# Patient Record
Sex: MALE | Race: WHITE | Employment: OTHER | URBAN - METROPOLITAN AREA
[De-identification: names, ages, dates, MRNs, and addresses within clinical notes are randomized per-mention and may not be internally consistent; named-entity substitution may affect disease eponyms.]

---

## 2017-03-02 ENCOUNTER — HOSPITAL ENCOUNTER (EMERGENCY)
Facility: HOSPITAL | Age: 62
Discharge: HOME | End: 2017-03-02
Attending: EMERGENCY MEDICINE
Payer: MEDICARE

## 2017-03-02 ENCOUNTER — APPOINTMENT (OUTPATIENT)
Dept: GENERAL RADIOLOGY | Facility: HOSPITAL | Age: 62
End: 2017-03-02
Attending: EMERGENCY MEDICINE
Payer: MEDICARE

## 2017-03-02 ENCOUNTER — PRIOR ORIGINAL RECORDS (OUTPATIENT)
Dept: OTHER | Age: 62
End: 2017-03-02

## 2017-03-02 VITALS
HEIGHT: 70 IN | OXYGEN SATURATION: 98 % | RESPIRATION RATE: 16 BRPM | WEIGHT: 285 LBS | SYSTOLIC BLOOD PRESSURE: 142 MMHG | BODY MASS INDEX: 40.8 KG/M2 | DIASTOLIC BLOOD PRESSURE: 76 MMHG | TEMPERATURE: 98 F | HEART RATE: 66 BPM

## 2017-03-02 DIAGNOSIS — R07.9 ACUTE CHEST PAIN: Primary | ICD-10-CM

## 2017-03-02 DIAGNOSIS — I10 ESSENTIAL HYPERTENSION: ICD-10-CM

## 2017-03-02 DIAGNOSIS — I25.5 ISCHEMIC CARDIOMYOPATHY: ICD-10-CM

## 2017-03-02 DIAGNOSIS — I25.10 CORONARY ARTERY DISEASE INVOLVING NATIVE HEART WITHOUT ANGINA PECTORIS, UNSPECIFIED VESSEL OR LESION TYPE: ICD-10-CM

## 2017-03-02 LAB
ALBUMIN SERPL-MCNC: 3.6 G/DL (ref 3.5–4.8)
ALP LIVER SERPL-CCNC: 90 U/L (ref 45–117)
ALT SERPL-CCNC: 24 U/L (ref 17–63)
APTT PPP: 34.9 SECONDS (ref 25–34)
AST SERPL-CCNC: 15 U/L (ref 15–41)
ATRIAL RATE: 73 BPM
BASOPHILS # BLD AUTO: 0.04 X10(3) UL (ref 0–0.1)
BASOPHILS NFR BLD AUTO: 0.7 %
BILIRUB SERPL-MCNC: 0.4 MG/DL (ref 0.1–2)
BUN BLD-MCNC: 17 MG/DL (ref 8–20)
CALCIUM BLD-MCNC: 9 MG/DL (ref 8.3–10.3)
CHLORIDE: 102 MMOL/L (ref 101–111)
CO2: 26 MMOL/L (ref 22–32)
CREAT BLD-MCNC: 1.03 MG/DL (ref 0.7–1.3)
EOSINOPHIL # BLD AUTO: 0.09 X10(3) UL (ref 0–0.3)
EOSINOPHIL NFR BLD AUTO: 1.5 %
ERYTHROCYTE [DISTWIDTH] IN BLOOD BY AUTOMATED COUNT: 14.9 % (ref 11.5–16)
GLUCOSE BLD-MCNC: 131 MG/DL (ref 70–99)
HCT VFR BLD AUTO: 36.3 % (ref 37–53)
HGB BLD-MCNC: 12 G/DL (ref 13–17)
IMMATURE GRANULOCYTE COUNT: 0.02 X10(3) UL (ref 0–1)
IMMATURE GRANULOCYTE RATIO %: 0.3 %
INR BLD: 2.12 (ref 0.89–1.11)
LYMPHOCYTES # BLD AUTO: 1.44 X10(3) UL (ref 0.9–4)
LYMPHOCYTES NFR BLD AUTO: 24 %
M PROTEIN MFR SERPL ELPH: 7.6 G/DL (ref 6.1–8.3)
MCH RBC QN AUTO: 28.6 PG (ref 27–33.2)
MCHC RBC AUTO-ENTMCNC: 33.1 G/DL (ref 31–37)
MCV RBC AUTO: 86.4 FL (ref 80–99)
MONOCYTES # BLD AUTO: 0.54 X10(3) UL (ref 0.1–0.6)
MONOCYTES NFR BLD AUTO: 9 %
NEUTROPHIL ABS PRELIM: 3.86 X10 (3) UL (ref 1.3–6.7)
NEUTROPHILS # BLD AUTO: 3.86 X10(3) UL (ref 1.3–6.7)
NEUTROPHILS NFR BLD AUTO: 64.5 %
P AXIS: 53 DEGREES
P-R INTERVAL: 180 MS
PLATELET # BLD AUTO: 248 10(3)UL (ref 150–450)
POTASSIUM SERPL-SCNC: 3.3 MMOL/L (ref 3.6–5.1)
PRO-BETA NATRIURETIC PEPTIDE: 89 PG/ML (ref ?–125)
PSA SERPL DL<=0.01 NG/ML-MCNC: 24.1 SECONDS (ref 12–14.3)
Q-T INTERVAL: 410 MS
QRS DURATION: 106 MS
QTC CALCULATION (BEZET): 451 MS
R AXIS: 1 DEGREES
RBC # BLD AUTO: 4.2 X10(6)UL (ref 4.3–5.7)
RED CELL DISTRIBUTION WIDTH-SD: 47.1 FL (ref 35.1–46.3)
SODIUM SERPL-SCNC: 139 MMOL/L (ref 136–144)
T AXIS: 40 DEGREES
TROPONIN: <0.046 NG/ML (ref ?–0.05)
TROPONIN: <0.046 NG/ML (ref ?–0.05)
VENTRICULAR RATE: 73 BPM
WBC # BLD AUTO: 6 X10(3) UL (ref 4–13)

## 2017-03-02 PROCEDURE — 93005 ELECTROCARDIOGRAM TRACING: CPT

## 2017-03-02 PROCEDURE — 99285 EMERGENCY DEPT VISIT HI MDM: CPT

## 2017-03-02 PROCEDURE — 84484 ASSAY OF TROPONIN QUANT: CPT | Performed by: EMERGENCY MEDICINE

## 2017-03-02 PROCEDURE — 85730 THROMBOPLASTIN TIME PARTIAL: CPT | Performed by: EMERGENCY MEDICINE

## 2017-03-02 PROCEDURE — 85025 COMPLETE CBC W/AUTO DIFF WBC: CPT | Performed by: EMERGENCY MEDICINE

## 2017-03-02 PROCEDURE — 93010 ELECTROCARDIOGRAM REPORT: CPT

## 2017-03-02 PROCEDURE — 80053 COMPREHEN METABOLIC PANEL: CPT | Performed by: EMERGENCY MEDICINE

## 2017-03-02 PROCEDURE — 96365 THER/PROPH/DIAG IV INF INIT: CPT

## 2017-03-02 PROCEDURE — 71010 XR CHEST AP PORTABLE  (CPT=71010): CPT

## 2017-03-02 PROCEDURE — 83880 ASSAY OF NATRIURETIC PEPTIDE: CPT | Performed by: EMERGENCY MEDICINE

## 2017-03-02 PROCEDURE — 85610 PROTHROMBIN TIME: CPT | Performed by: EMERGENCY MEDICINE

## 2017-03-02 RX ORDER — ISOSORBIDE MONONITRATE 30 MG/1
30 TABLET, EXTENDED RELEASE ORAL DAILY
Qty: 20 TABLET | Refills: 0 | Status: ON HOLD | OUTPATIENT
Start: 2017-03-02 | End: 2017-03-16

## 2017-03-02 RX ORDER — ISOSORBIDE MONONITRATE 30 MG/1
30 TABLET, EXTENDED RELEASE ORAL DAILY
Status: DISCONTINUED | OUTPATIENT
Start: 2017-03-02 | End: 2017-03-02

## 2017-03-02 RX ORDER — NITROGLYCERIN 20 MG/100ML
INJECTION INTRAVENOUS CONTINUOUS
Status: DISCONTINUED | OUTPATIENT
Start: 2017-03-02 | End: 2017-03-02

## 2017-03-02 RX ORDER — ASPIRIN 81 MG/1
324 TABLET, CHEWABLE ORAL ONCE
Status: COMPLETED | OUTPATIENT
Start: 2017-03-02 | End: 2017-03-02

## 2017-03-02 NOTE — ED PROVIDER NOTES
Patient Seen in: [facility] Emergency Department    History   Patient presents with:  Chest Pain Angina (cardiovascular)    Stated Complaint: chest pain    HPI    52-year-old male with a history of hypertension, hyperlipidemia, morbid obesity, statu total) by mouth nightly. Potassium Chloride ER 20 MEQ Oral Tab CR,  Take 1 tablet (20 mEq total) by mouth daily. HydrALAZINE HCl 100 MG Oral Tab,  Take 100 mg by mouth 3 (three) times daily.      Montelukast Sodium 10 MG Oral Tab,  Take 10 mg by mouth n evidence of injection or perforation. Neck exam: Supple. There is no lymphadenopathy or carotid bruit. Cardiovascular exam: Regular rate and rhythm. There are no murmurs, rubs, gallops. Lungs: Clear to auscultation bilaterally.   There is no audible wh results for these tests on the individual orders. RAINBOW DRAW BLUE   RAINBOW DRAW GOLD   RAINBOW DRAW LAVENDER   RAINBOW DRAW LIGHT GREEN      EKG    Rate, intervals and axes as noted on EKG Report.   Rate: 73  Rhythm: Sinus Rhythm  Reading: Normal sinus pain-free. His second troponin was negative. After his Imdur dose of 30 mg his repeat blood pressure was 563 systolic. Patient will be discharged home with the change in medications as directed.   Follow-up with his cardiologist.  He is instructed to ret

## 2017-03-02 NOTE — ED NOTES
Per Dr. Paco Morrow to hold nitroglycerin gtt at this time and new orders received. Updated with pt and pt family with plan of care.

## 2017-03-02 NOTE — ED INITIAL ASSESSMENT (HPI)
Pt presents to the ed with c/o mid-sternal chest pain that started this am at 730 and woke him up from his sleeping, has a headache but denies any n/v/d or fevers/chills at this time. Patient states that the pain radiates to his back at times.   Pt denies

## 2017-03-02 NOTE — ED NOTES
Clarified Imdur order pt to take 60mg PO in the am and than add 30mg PO at night. Dr. Michael Montelongo notified. Detailed discharge instructions were given to patient and patient family at this time.

## 2017-03-08 ENCOUNTER — PRIOR ORIGINAL RECORDS (OUTPATIENT)
Dept: OTHER | Age: 62
End: 2017-03-08

## 2017-03-16 ENCOUNTER — PRIOR ORIGINAL RECORDS (OUTPATIENT)
Dept: OTHER | Age: 62
End: 2017-03-16

## 2017-03-16 ENCOUNTER — APPOINTMENT (OUTPATIENT)
Dept: GENERAL RADIOLOGY | Facility: HOSPITAL | Age: 62
DRG: 310 | End: 2017-03-16
Attending: EMERGENCY MEDICINE
Payer: MEDICARE

## 2017-03-16 ENCOUNTER — HOSPITAL ENCOUNTER (INPATIENT)
Facility: HOSPITAL | Age: 62
LOS: 1 days | Discharge: HOME | DRG: 310 | End: 2017-03-16
Attending: EMERGENCY MEDICINE | Admitting: INTERNAL MEDICINE
Payer: MEDICARE

## 2017-03-16 VITALS
HEART RATE: 71 BPM | OXYGEN SATURATION: 92 % | BODY MASS INDEX: 41 KG/M2 | DIASTOLIC BLOOD PRESSURE: 57 MMHG | WEIGHT: 283.75 LBS | TEMPERATURE: 98 F | RESPIRATION RATE: 18 BRPM | SYSTOLIC BLOOD PRESSURE: 102 MMHG

## 2017-03-16 DIAGNOSIS — I47.2 VENTRICULAR TACHYARRHYTHMIA (HCC): Primary | ICD-10-CM

## 2017-03-16 PROBLEM — R73.9 HYPERGLYCEMIA: Status: ACTIVE | Noted: 2017-03-16

## 2017-03-16 PROBLEM — G47.33 OSA ON CPAP: Status: ACTIVE | Noted: 2017-03-16

## 2017-03-16 PROBLEM — I47.20 VENTRICULAR TACHYARRHYTHMIA (HCC): Status: ACTIVE | Noted: 2017-03-16

## 2017-03-16 PROBLEM — E87.6 HYPOKALEMIA: Status: ACTIVE | Noted: 2017-03-16

## 2017-03-16 PROBLEM — N17.9 ACUTE KIDNEY INJURY (HCC): Status: ACTIVE | Noted: 2017-03-16

## 2017-03-16 PROBLEM — Z99.89 OSA ON CPAP: Status: ACTIVE | Noted: 2017-03-16

## 2017-03-16 LAB
ALBUMIN SERPL-MCNC: 3.9 G/DL (ref 3.5–4.8)
ALP LIVER SERPL-CCNC: 93 U/L (ref 45–117)
ALT SERPL-CCNC: 25 U/L (ref 17–63)
AST SERPL-CCNC: 16 U/L (ref 15–41)
BASOPHILS # BLD AUTO: 0.03 X10(3) UL (ref 0–0.1)
BASOPHILS NFR BLD AUTO: 0.3 %
BILIRUB SERPL-MCNC: 0.4 MG/DL (ref 0.1–2)
BUN BLD-MCNC: 17 MG/DL (ref 8–20)
CALCIUM BLD-MCNC: 8.6 MG/DL (ref 8.3–10.3)
CHLORIDE: 99 MMOL/L (ref 101–111)
CO2: 23 MMOL/L (ref 22–32)
CREAT BLD-MCNC: 1.41 MG/DL (ref 0.7–1.3)
EOSINOPHIL # BLD AUTO: 0.05 X10(3) UL (ref 0–0.3)
EOSINOPHIL NFR BLD AUTO: 0.6 %
ERYTHROCYTE [DISTWIDTH] IN BLOOD BY AUTOMATED COUNT: 14.7 % (ref 11.5–16)
GLUCOSE BLD-MCNC: 119 MG/DL (ref 65–99)
GLUCOSE BLD-MCNC: 126 MG/DL (ref 65–99)
GLUCOSE BLD-MCNC: 143 MG/DL (ref 70–99)
GLUCOSE BLD-MCNC: 84 MG/DL (ref 65–99)
HAV IGM SER QL: 1.8 MG/DL (ref 1.7–3)
HCT VFR BLD AUTO: 37.7 % (ref 37–53)
HGB BLD-MCNC: 12.7 G/DL (ref 13–17)
IMMATURE GRANULOCYTE COUNT: 0.02 X10(3) UL (ref 0–1)
IMMATURE GRANULOCYTE RATIO %: 0.2 %
INR BLD: 2.38 (ref 0.89–1.11)
LYMPHOCYTES # BLD AUTO: 1.69 X10(3) UL (ref 0.9–4)
LYMPHOCYTES NFR BLD AUTO: 19.1 %
M PROTEIN MFR SERPL ELPH: 8.4 G/DL (ref 6.1–8.3)
MCH RBC QN AUTO: 28.6 PG (ref 27–33.2)
MCHC RBC AUTO-ENTMCNC: 33.7 G/DL (ref 31–37)
MCV RBC AUTO: 84.9 FL (ref 80–99)
MONOCYTES # BLD AUTO: 0.68 X10(3) UL (ref 0.1–0.6)
MONOCYTES NFR BLD AUTO: 7.7 %
NEUTROPHIL ABS PRELIM: 6.38 X10 (3) UL (ref 1.3–6.7)
NEUTROPHILS # BLD AUTO: 6.38 X10(3) UL (ref 1.3–6.7)
NEUTROPHILS NFR BLD AUTO: 72.1 %
PLATELET # BLD AUTO: 327 10(3)UL (ref 150–450)
POTASSIUM SERPL-SCNC: 3.2 MMOL/L (ref 3.6–5.1)
POTASSIUM SERPL-SCNC: 3.7 MMOL/L (ref 3.6–5.1)
PSA SERPL DL<=0.01 NG/ML-MCNC: 26.4 SECONDS (ref 12–14.3)
RBC # BLD AUTO: 4.44 X10(6)UL (ref 4.3–5.7)
RED CELL DISTRIBUTION WIDTH-SD: 45.1 FL (ref 35.1–46.3)
SODIUM SERPL-SCNC: 137 MMOL/L (ref 136–144)
TROPONIN: <0.046 NG/ML (ref ?–0.05)
WBC # BLD AUTO: 8.9 X10(3) UL (ref 4–13)

## 2017-03-16 PROCEDURE — 83735 ASSAY OF MAGNESIUM: CPT | Performed by: EMERGENCY MEDICINE

## 2017-03-16 PROCEDURE — 85610 PROTHROMBIN TIME: CPT | Performed by: EMERGENCY MEDICINE

## 2017-03-16 PROCEDURE — 85025 COMPLETE CBC W/AUTO DIFF WBC: CPT | Performed by: EMERGENCY MEDICINE

## 2017-03-16 PROCEDURE — 93005 ELECTROCARDIOGRAM TRACING: CPT

## 2017-03-16 PROCEDURE — 93283 PRGRMG EVAL IMPLANTABLE DFB: CPT

## 2017-03-16 PROCEDURE — 4B02XTZ MEASUREMENT OF CARDIAC DEFIBRILLATOR, EXTERNAL APPROACH: ICD-10-PCS | Performed by: INTERNAL MEDICINE

## 2017-03-16 PROCEDURE — 4A02XFZ MEASUREMENT OF CARDIAC RHYTHM, EXTERNAL APPROACH: ICD-10-PCS | Performed by: INTERNAL MEDICINE

## 2017-03-16 PROCEDURE — 4A02X4Z MEASUREMENT OF CARDIAC ELECTRICAL ACTIVITY, EXTERNAL APPROACH: ICD-10-PCS | Performed by: INTERNAL MEDICINE

## 2017-03-16 PROCEDURE — 93010 ELECTROCARDIOGRAM REPORT: CPT

## 2017-03-16 PROCEDURE — 94660 CPAP INITIATION&MGMT: CPT

## 2017-03-16 PROCEDURE — 82962 GLUCOSE BLOOD TEST: CPT

## 2017-03-16 PROCEDURE — 84132 ASSAY OF SERUM POTASSIUM: CPT | Performed by: INTERNAL MEDICINE

## 2017-03-16 PROCEDURE — 80053 COMPREHEN METABOLIC PANEL: CPT | Performed by: EMERGENCY MEDICINE

## 2017-03-16 PROCEDURE — 99285 EMERGENCY DEPT VISIT HI MDM: CPT

## 2017-03-16 PROCEDURE — 71010 XR CHEST AP PORTABLE  (CPT=71010): CPT

## 2017-03-16 PROCEDURE — 84484 ASSAY OF TROPONIN QUANT: CPT | Performed by: EMERGENCY MEDICINE

## 2017-03-16 PROCEDURE — 36415 COLL VENOUS BLD VENIPUNCTURE: CPT

## 2017-03-16 RX ORDER — WARFARIN SODIUM 2 MG/1
4 TABLET ORAL NIGHTLY
Status: DISCONTINUED | OUTPATIENT
Start: 2017-03-16 | End: 2017-03-16

## 2017-03-16 RX ORDER — LOSARTAN POTASSIUM 100 MG/1
100 TABLET ORAL DAILY
Status: DISCONTINUED | OUTPATIENT
Start: 2017-03-16 | End: 2017-03-16

## 2017-03-16 RX ORDER — PANTOPRAZOLE SODIUM 20 MG/1
20 TABLET, DELAYED RELEASE ORAL NIGHTLY
Status: DISCONTINUED | OUTPATIENT
Start: 2017-03-16 | End: 2017-03-16

## 2017-03-16 RX ORDER — HYDROCODONE BITARTRATE AND ACETAMINOPHEN 10; 325 MG/1; MG/1
1 TABLET ORAL EVERY 6 HOURS PRN
COMMUNITY
End: 2018-09-25 | Stop reason: ALTCHOICE

## 2017-03-16 RX ORDER — SODIUM CHLORIDE 9 MG/ML
INJECTION, SOLUTION INTRAVENOUS CONTINUOUS
Status: DISCONTINUED | OUTPATIENT
Start: 2017-03-16 | End: 2017-03-16

## 2017-03-16 RX ORDER — POTASSIUM CHLORIDE 20 MEQ/1
40 TABLET, EXTENDED RELEASE ORAL EVERY 4 HOURS
Status: COMPLETED | OUTPATIENT
Start: 2017-03-16 | End: 2017-03-16

## 2017-03-16 RX ORDER — ACETAMINOPHEN 500 MG
500 TABLET ORAL EVERY 6 HOURS PRN
Status: DISCONTINUED | OUTPATIENT
Start: 2017-03-16 | End: 2017-03-16

## 2017-03-16 RX ORDER — MEXILETINE HYDROCHLORIDE 150 MG/1
150 CAPSULE ORAL 2 TIMES DAILY
Qty: 60 CAPSULE | Refills: 5 | Status: ON HOLD | OUTPATIENT
Start: 2017-03-16 | End: 2018-10-20

## 2017-03-16 RX ORDER — ISOSORBIDE MONONITRATE 60 MG/1
60 TABLET, EXTENDED RELEASE ORAL NIGHTLY
Status: DISCONTINUED | OUTPATIENT
Start: 2017-03-16 | End: 2017-03-16

## 2017-03-16 RX ORDER — CLOPIDOGREL BISULFATE 75 MG/1
75 TABLET ORAL DAILY
Status: DISCONTINUED | OUTPATIENT
Start: 2017-03-16 | End: 2017-03-16

## 2017-03-16 RX ORDER — POTASSIUM CHLORIDE 20 MEQ/1
20 TABLET, EXTENDED RELEASE ORAL DAILY
Status: DISCONTINUED | OUTPATIENT
Start: 2017-03-16 | End: 2017-03-16

## 2017-03-16 RX ORDER — CARVEDILOL 12.5 MG/1
25 TABLET ORAL 2 TIMES DAILY WITH MEALS
Status: DISCONTINUED | OUTPATIENT
Start: 2017-03-16 | End: 2017-03-16

## 2017-03-16 RX ORDER — MAGNESIUM OXIDE 400 MG (241.3 MG MAGNESIUM) TABLET
400 TABLET ONCE
Status: COMPLETED | OUTPATIENT
Start: 2017-03-16 | End: 2017-03-16

## 2017-03-16 RX ORDER — CHOLECALCIFEROL (VITAMIN D3) 50 MCG
1 CAPSULE ORAL NIGHTLY
Status: ON HOLD | COMMUNITY
End: 2018-09-27

## 2017-03-16 RX ORDER — ATORVASTATIN CALCIUM 80 MG/1
80 TABLET, FILM COATED ORAL NIGHTLY
Status: DISCONTINUED | OUTPATIENT
Start: 2017-03-16 | End: 2017-03-16

## 2017-03-16 RX ORDER — ACETAMINOPHEN 80 MG/1
500 TABLET, CHEWABLE ORAL EVERY 6 HOURS PRN
Status: DISCONTINUED | OUTPATIENT
Start: 2017-03-16 | End: 2017-03-16 | Stop reason: SDUPTHER

## 2017-03-16 RX ORDER — OMEGA-3/DHA/EPA/FISH OIL 60 MG-90MG
500 CAPSULE ORAL 2 TIMES DAILY
Status: DISCONTINUED | OUTPATIENT
Start: 2017-03-16 | End: 2017-03-16 | Stop reason: RX

## 2017-03-16 RX ORDER — OMEGA-3/DHA/EPA/FISH OIL 60 MG-90MG
1200 CAPSULE ORAL 2 TIMES DAILY
COMMUNITY
End: 2018-09-25

## 2017-03-16 RX ORDER — SERTRALINE HYDROCHLORIDE 100 MG/1
100 TABLET, FILM COATED ORAL DAILY
Status: DISCONTINUED | OUTPATIENT
Start: 2017-03-16 | End: 2017-03-16

## 2017-03-16 RX ORDER — HYDROCODONE BITARTRATE AND ACETAMINOPHEN 10; 325 MG/1; MG/1
1 TABLET ORAL EVERY 6 HOURS PRN
Status: DISCONTINUED | OUTPATIENT
Start: 2017-03-16 | End: 2017-03-16

## 2017-03-16 RX ORDER — TORSEMIDE 20 MG/1
20 TABLET ORAL DAILY
Status: DISCONTINUED | OUTPATIENT
Start: 2017-03-16 | End: 2017-03-16

## 2017-03-16 RX ORDER — ISOSORBIDE MONONITRATE 30 MG/1
60 TABLET, EXTENDED RELEASE ORAL NIGHTLY
Qty: 20 TABLET | Refills: 0 | Status: ON HOLD | OUTPATIENT
Start: 2017-03-16 | End: 2017-08-19

## 2017-03-16 RX ORDER — MONTELUKAST SODIUM 10 MG/1
10 TABLET ORAL DAILY
Status: DISCONTINUED | OUTPATIENT
Start: 2017-03-16 | End: 2017-03-16

## 2017-03-16 RX ORDER — AMIODARONE HYDROCHLORIDE 200 MG/1
200 TABLET ORAL DAILY
Status: DISCONTINUED | OUTPATIENT
Start: 2017-03-16 | End: 2017-03-16

## 2017-03-16 NOTE — CONSULTS
CARDIOLOGY CONSULT - [facility]      NAME: Louise Key - ROOM: A4/A4 - MRN: [id number] - Age: 59year old - :  1955    Date of Admission: 3/16/2017  1:21 AM  Admission Diagnosis: There are no admission diagnoses coronary artery    • Esophageal reflux    • Arthritis         PSH:     Past Surgical History    BYPASS SURGERY  2000    KNEE REPLACEMENT SURGERY      FRACTURE SURGERY          Social History:  Tobacco: negative    Family History of Heart Disease: + CAD in Encounters:  03/16/17 : 286 lb (129.729 kg)  03/02/17 : 285 lb (129.275 kg)  11/09/16 : 278 lb 7.1 oz (126.3 kg)      No intake or output data in the 24 hours ending 03/16/17 0321    Rhythm:  sinus  Neuro:  Alert and oriented, nonfocal  Neck:  No jvd  HEEN

## 2017-03-16 NOTE — PROCEDURES
Procedure  1. ICD induction and DFT test  2. ICD interogation and reprogramming    Indication:  1. 60 y/o with h/o ischemic CMP and ICD implant last year for VF presents for DFT testing. Recently presented with fast VT.   Shock resulted in long Type II norah
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
## 2017-03-16 NOTE — PLAN OF CARE
CARDIOVASCULAR - ADULT    • Maintains optimal cardiac output and hemodynamic stability Progressing    • Absence of cardiac arrhythmias or at baseline Progressing          Assumed care of pt at 0730. Denies any pain or SOB on RA. Sinus rhythm, HR 60-70s.   R

## 2017-03-16 NOTE — ED NOTES
Report given to MONTANA Miller. Pt resting comfortably with no complaints at this time awaiting transport to floor.

## 2017-03-16 NOTE — PLAN OF CARE
Post AICD induction check with Dr. Destiny Markham. AICD delivered shock when v-tach induced. Report given to floor nurse. Patient transferred via bed back to the floor.

## 2017-03-16 NOTE — CONSULTS
Nutritional supplement discontinuation per Policy:    For patient Kishore Christie, Fish oil caps have been discontinued per policy. This supplement may be restarted upon discharge using the medication reconciliation process.     Thanks,  Jacki Jiménez Pharm
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
## 2017-03-16 NOTE — RESPIRATORY THERAPY NOTE
FAISAL : EQUIPMENT USE: DAILY SUMMARY                                            SET MODE:  AUTO CPAP WITH CFLEX                                          USAGE IN HOURS: 2:07                                          9

## 2017-03-16 NOTE — CM/SW NOTE
03/16/17 1600   CM/SW Referral Data   Referral Source Social Work (self-referral)   Reason for Referral Discharge planning   Informant Patient   Patient Info   Patient's Mental Status Alert;Oriented   Patient's [address]   Number of Levels

## 2017-03-16 NOTE — HISTORICAL OFFICE NOTE
Yudith Garcia  : 1955  ACCOUNT:  [id number]  [phone]  PCP: Dr. Karo Lofton     TODAY'S DATE: 2016  DICTATED BY:  Alona Padilla M.D.]    CHIEF COMPLAINT: [Followup of Cardiomyopathy, ischemic.]    HPI:    [On 2016, Valarie Dutton 4V CABG 11/00, angina, CAD, cardiac catheterization, dyslipidemia, hypertension, July 2005: nml LV contractility and hemodynamics, Multivessel native CAD, patent seq,saph. vein graft to the 1st OM,main circ and dist. RCA with patent LIMA to LAD, NQWMI, pat Lipoprotein Deficiencies    PLAN:    [1.  Ischemic cardiomyopathy status post percutaneous revascularization. At this point, I do not think he needs a stress test or a repeat angiogram at this time.   2.  He follows in the ICD clinic for his device which w
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
## 2017-03-17 LAB
ATRIAL RATE: 72 BPM
P AXIS: 30 DEGREES
P-R INTERVAL: 164 MS
Q-T INTERVAL: 402 MS
QRS DURATION: 112 MS
QTC CALCULATION (BEZET): 440 MS
R AXIS: 1 DEGREES
T AXIS: 36 DEGREES
VENTRICULAR RATE: 72 BPM

## 2017-03-17 NOTE — CM/SW NOTE
03/17/17 0800   Discharge disposition   Discharged to: Home or [address] [place] [address] services after discharge None   Discharge transportation Private car   Patient discharged 3/16/17

## 2017-03-17 NOTE — PROGRESS NOTES
NURSING DISCHARGE NOTE    Received orders to discharge pt. Instructed pt about prescribed medications and provided educational handout. Printed prescriptions handed to pt. Instructed pt about follow-up appointments per MD order.  Pt verbalized [place]

## 2017-03-23 ENCOUNTER — PRIOR ORIGINAL RECORDS (OUTPATIENT)
Dept: OTHER | Age: 62
End: 2017-03-23

## 2017-03-27 LAB
HEMATOCRIT: 37.7 %
HEMOGLOBIN: 12.7 G/DL
PLATELETS: 327 K/UL
PROBNP: 89 PG/ML
RED BLOOD COUNT: 4.44 X 10-6/U
WHITE BLOOD COUNT: 8.9 X 10-3/U

## 2017-07-07 ENCOUNTER — MYAURORA ACCOUNT LINK (OUTPATIENT)
Dept: OTHER | Age: 62
End: 2017-07-07

## 2017-08-06 ENCOUNTER — HOSPITAL ENCOUNTER (OUTPATIENT)
Facility: HOSPITAL | Age: 62
Setting detail: OBSERVATION
Discharge: HOME | End: 2017-08-07
Attending: EMERGENCY MEDICINE | Admitting: HOSPITALIST
Payer: MEDICARE

## 2017-08-06 ENCOUNTER — APPOINTMENT (OUTPATIENT)
Dept: GENERAL RADIOLOGY | Facility: HOSPITAL | Age: 62
End: 2017-08-06
Attending: EMERGENCY MEDICINE
Payer: MEDICARE

## 2017-08-06 ENCOUNTER — APPOINTMENT (OUTPATIENT)
Dept: CT IMAGING | Facility: HOSPITAL | Age: 62
End: 2017-08-06
Attending: EMERGENCY MEDICINE
Payer: MEDICARE

## 2017-08-06 DIAGNOSIS — R07.9 ACUTE CHEST PAIN: Primary | ICD-10-CM

## 2017-08-06 LAB
ALBUMIN SERPL-MCNC: 3.6 G/DL (ref 3.5–4.8)
ALP LIVER SERPL-CCNC: 80 U/L (ref 45–117)
ALT SERPL-CCNC: 36 U/L (ref 17–63)
AST SERPL-CCNC: 25 U/L (ref 15–41)
ATRIAL RATE: 62 BPM
BASOPHILS # BLD AUTO: 0.03 X10(3) UL (ref 0–0.1)
BASOPHILS NFR BLD AUTO: 0.5 %
BILIRUB SERPL-MCNC: 0.3 MG/DL (ref 0.1–2)
BUN BLD-MCNC: 13 MG/DL (ref 8–20)
CALCIUM BLD-MCNC: 8.8 MG/DL (ref 8.3–10.3)
CHLORIDE: 104 MMOL/L (ref 101–111)
CO2: 26 MMOL/L (ref 22–32)
CREAT BLD-MCNC: 1.28 MG/DL (ref 0.7–1.3)
EOSINOPHIL # BLD AUTO: 0.07 X10(3) UL (ref 0–0.3)
EOSINOPHIL NFR BLD AUTO: 1.2 %
ERYTHROCYTE [DISTWIDTH] IN BLOOD BY AUTOMATED COUNT: 15.8 % (ref 11.5–16)
EST. AVERAGE GLUCOSE BLD GHB EST-MCNC: 151 MG/DL (ref 68–126)
GLUCOSE BLD-MCNC: 118 MG/DL (ref 65–99)
GLUCOSE BLD-MCNC: 125 MG/DL (ref 65–99)
GLUCOSE BLD-MCNC: 126 MG/DL (ref 70–99)
GLUCOSE BLD-MCNC: 94 MG/DL (ref 65–99)
HBA1C MFR BLD HPLC: 6.9 % (ref ?–5.7)
HCT VFR BLD AUTO: 37.9 % (ref 37–53)
HGB BLD-MCNC: 12.1 G/DL (ref 13–17)
IMMATURE GRANULOCYTE COUNT: 0.01 X10(3) UL (ref 0–1)
IMMATURE GRANULOCYTE RATIO %: 0.2 %
INR BLD: 2.85 (ref 0.89–1.11)
LYMPHOCYTES # BLD AUTO: 1.57 X10(3) UL (ref 0.9–4)
LYMPHOCYTES NFR BLD AUTO: 27.2 %
M PROTEIN MFR SERPL ELPH: 7.5 G/DL (ref 6.1–8.3)
MCH RBC QN AUTO: 27.5 PG (ref 27–33.2)
MCHC RBC AUTO-ENTMCNC: 31.9 G/DL (ref 31–37)
MCV RBC AUTO: 86.1 FL (ref 80–99)
MONOCYTES # BLD AUTO: 0.44 X10(3) UL (ref 0.1–0.6)
MONOCYTES NFR BLD AUTO: 7.6 %
NEUTROPHIL ABS PRELIM: 3.65 X10 (3) UL (ref 1.3–6.7)
NEUTROPHILS # BLD AUTO: 3.65 X10(3) UL (ref 1.3–6.7)
NEUTROPHILS NFR BLD AUTO: 63.3 %
P AXIS: 48 DEGREES
P-R INTERVAL: 176 MS
PLATELET # BLD AUTO: 276 10(3)UL (ref 150–450)
POTASSIUM SERPL-SCNC: 3.8 MMOL/L (ref 3.6–5.1)
PSA SERPL DL<=0.01 NG/ML-MCNC: 30.5 SECONDS (ref 12–14.3)
Q-T INTERVAL: 426 MS
QRS DURATION: 100 MS
QTC CALCULATION (BEZET): 432 MS
R AXIS: 12 DEGREES
RBC # BLD AUTO: 4.4 X10(6)UL (ref 4.3–5.7)
RED CELL DISTRIBUTION WIDTH-SD: 49.1 FL (ref 35.1–46.3)
SODIUM SERPL-SCNC: 140 MMOL/L (ref 136–144)
T AXIS: 50 DEGREES
TROPONIN: <0.046 NG/ML (ref ?–0.05)
VENTRICULAR RATE: 62 BPM
WBC # BLD AUTO: 5.8 X10(3) UL (ref 4–13)

## 2017-08-06 PROCEDURE — 71275 CT ANGIOGRAPHY CHEST: CPT | Performed by: EMERGENCY MEDICINE

## 2017-08-06 PROCEDURE — 74175 CTA ABDOMEN W/CONTRAST: CPT | Performed by: EMERGENCY MEDICINE

## 2017-08-06 PROCEDURE — 71010 XR CHEST AP PORTABLE  (CPT=71010): CPT | Performed by: EMERGENCY MEDICINE

## 2017-08-06 PROCEDURE — 99220 INITIAL OBSERVATION CARE,LEVL III: CPT | Performed by: INTERNAL MEDICINE

## 2017-08-06 RX ORDER — OMEGA-3/DHA/EPA/FISH OIL 60 MG-90MG
500 CAPSULE ORAL 2 TIMES DAILY
Status: DISCONTINUED | OUTPATIENT
Start: 2017-08-06 | End: 2017-08-06 | Stop reason: RX

## 2017-08-06 RX ORDER — CLOPIDOGREL BISULFATE 75 MG/1
75 TABLET ORAL DAILY
Status: DISCONTINUED | OUTPATIENT
Start: 2017-08-06 | End: 2017-08-07

## 2017-08-06 RX ORDER — WARFARIN SODIUM 2 MG/1
4 TABLET ORAL
Status: DISCONTINUED | OUTPATIENT
Start: 2017-08-07 | End: 2017-08-07

## 2017-08-06 RX ORDER — LOSARTAN POTASSIUM 100 MG/1
100 TABLET ORAL DAILY
Status: DISCONTINUED | OUTPATIENT
Start: 2017-08-06 | End: 2017-08-07

## 2017-08-06 RX ORDER — MONTELUKAST SODIUM 10 MG/1
10 TABLET ORAL NIGHTLY
Status: DISCONTINUED | OUTPATIENT
Start: 2017-08-06 | End: 2017-08-07

## 2017-08-06 RX ORDER — SODIUM CHLORIDE 9 MG/ML
INJECTION, SOLUTION INTRAVENOUS CONTINUOUS
Status: ACTIVE | OUTPATIENT
Start: 2017-08-06 | End: 2017-08-06

## 2017-08-06 RX ORDER — NITROGLYCERIN 0.4 MG/1
0.4 TABLET SUBLINGUAL EVERY 5 MIN PRN
Status: DISCONTINUED | OUTPATIENT
Start: 2017-08-06 | End: 2017-08-07

## 2017-08-06 RX ORDER — ATORVASTATIN CALCIUM 80 MG/1
80 TABLET, FILM COATED ORAL NIGHTLY
Status: DISCONTINUED | OUTPATIENT
Start: 2017-08-06 | End: 2017-08-07

## 2017-08-06 RX ORDER — ONDANSETRON 2 MG/ML
4 INJECTION INTRAMUSCULAR; INTRAVENOUS EVERY 6 HOURS PRN
Status: DISCONTINUED | OUTPATIENT
Start: 2017-08-06 | End: 2017-08-07

## 2017-08-06 RX ORDER — ASPIRIN 325 MG
325 TABLET ORAL DAILY
Status: DISCONTINUED | OUTPATIENT
Start: 2017-08-06 | End: 2017-08-06

## 2017-08-06 RX ORDER — AMIODARONE HYDROCHLORIDE 200 MG/1
200 TABLET ORAL
Status: DISCONTINUED | OUTPATIENT
Start: 2017-08-06 | End: 2017-08-07

## 2017-08-06 RX ORDER — PANTOPRAZOLE SODIUM 20 MG/1
20 TABLET, DELAYED RELEASE ORAL NIGHTLY
Status: DISCONTINUED | OUTPATIENT
Start: 2017-08-06 | End: 2017-08-07

## 2017-08-06 RX ORDER — ASPIRIN 81 MG/1
81 TABLET, CHEWABLE ORAL ONCE
Status: COMPLETED | OUTPATIENT
Start: 2017-08-06 | End: 2017-08-06

## 2017-08-06 RX ORDER — ONDANSETRON 2 MG/ML
4 INJECTION INTRAMUSCULAR; INTRAVENOUS EVERY 4 HOURS PRN
Status: DISCONTINUED | OUTPATIENT
Start: 2017-08-06 | End: 2017-08-06

## 2017-08-06 RX ORDER — ISOSORBIDE MONONITRATE 60 MG/1
60 TABLET, EXTENDED RELEASE ORAL NIGHTLY
Status: DISCONTINUED | OUTPATIENT
Start: 2017-08-06 | End: 2017-08-07

## 2017-08-06 RX ORDER — POTASSIUM CHLORIDE 20 MEQ/1
20 TABLET, EXTENDED RELEASE ORAL DAILY
Status: DISCONTINUED | OUTPATIENT
Start: 2017-08-06 | End: 2017-08-07

## 2017-08-06 RX ORDER — MEXILETINE HYDROCHLORIDE 150 MG/1
150 CAPSULE ORAL 2 TIMES DAILY
Status: DISCONTINUED | OUTPATIENT
Start: 2017-08-06 | End: 2017-08-07

## 2017-08-06 RX ORDER — CARVEDILOL 12.5 MG/1
25 TABLET ORAL 2 TIMES DAILY WITH MEALS
Status: DISCONTINUED | OUTPATIENT
Start: 2017-08-06 | End: 2017-08-07

## 2017-08-06 RX ORDER — ACETAMINOPHEN 500 MG
500 TABLET ORAL EVERY 6 HOURS PRN
Status: DISCONTINUED | OUTPATIENT
Start: 2017-08-06 | End: 2017-08-07

## 2017-08-06 RX ORDER — HYDRALAZINE HYDROCHLORIDE 20 MG/ML
5 INJECTION INTRAMUSCULAR; INTRAVENOUS EVERY 6 HOURS PRN
Status: DISCONTINUED | OUTPATIENT
Start: 2017-08-06 | End: 2017-08-07

## 2017-08-06 RX ORDER — POTASSIUM CHLORIDE 20 MEQ/1
40 TABLET, EXTENDED RELEASE ORAL ONCE
Status: COMPLETED | OUTPATIENT
Start: 2017-08-06 | End: 2017-08-06

## 2017-08-06 RX ORDER — DEXTROSE MONOHYDRATE 25 G/50ML
50 INJECTION, SOLUTION INTRAVENOUS
Status: DISCONTINUED | OUTPATIENT
Start: 2017-08-06 | End: 2017-08-07

## 2017-08-06 RX ORDER — TORSEMIDE 20 MG/1
20 TABLET ORAL DAILY
Status: DISCONTINUED | OUTPATIENT
Start: 2017-08-06 | End: 2017-08-07

## 2017-08-06 RX ORDER — SERTRALINE HYDROCHLORIDE 100 MG/1
100 TABLET, FILM COATED ORAL DAILY
Status: DISCONTINUED | OUTPATIENT
Start: 2017-08-06 | End: 2017-08-07

## 2017-08-06 RX ORDER — HYDROCODONE BITARTRATE AND ACETAMINOPHEN 10; 325 MG/1; MG/1
1 TABLET ORAL EVERY 6 HOURS PRN
Status: DISCONTINUED | OUTPATIENT
Start: 2017-08-06 | End: 2017-08-07

## 2017-08-06 NOTE — ED INITIAL ASSESSMENT (HPI)
PT STARTED THIS AM 30MIN PTA WITH SHARP PAIN TO MIDSTERNUM LASTING 2 MINUTES. PAIN FREE AT PRESENT HOWEVER IS CURRENTLY NAUSEATED. DENIES VOMITING. PT WAS SOB DURING THE EPISODE AS WELL. DENIES DIAPHORESIS.

## 2017-08-06 NOTE — CONSULTS
[facility]  Report of Consultation    Mya Gresham Patient Status:  Emergency    1955 MRN [id number]   Location [address] [facility] Attending Lorna Dunaway MD   Hosp Day # 0 PCP Donnell Chauhan MD     Reason for Consu Unspecified essential hypertension      Past Surgical History:  2000: BYPASS SURGERY  No date: CABG  No date: CARDIAC DEFIBRILLATOR PLACEMENT      Comment: [employer] single lead ICD  No date: CATH DRUG ELUTING STENT  No date: FRACTURE SURGERY  No d sinus rhythm. Possible old septal myocardial infarction. CXR: No acute processes  Initial troponin is negative.   Labs:     Lab Results  Component Value Date   WBC 5.8 08/06/2017   HGB 12.1 08/06/2017   HCT 37.9 08/06/2017   .0 08/06/2017   CREATSE reviewed. Isolated episode of chest pain which patient thinks is different from his cardiac issues he has had in the past initial troponin is negative.   Given the fact that he is on chronic Coumadin therapy for history of pulmonary embolism with a negativ

## 2017-08-06 NOTE — H&P
[facility]                                                               History & Physical         Glennette Lights Patient Status:  Observation    1955 MRN [id number]   [facility] 8NE-A Attending Pérez Almazan MD   [address] [facility] • Stroke Father    • Cancer Father    • Cancer Mother    • Cancer Maternal Grandfather    • Heart Disease Paternal Grandfather    • Heart Disease Brother    • Heart Disease Self    • Cancer Son       Reviewed    Social history:   reports that he has neve Disp: 30 tablet Rfl: 1 8/5/2017 at Unknown time   Montelukast Sodium 10 MG Oral Tab Take 10 mg by mouth daily. Disp:  Rfl:  8/5/2017 at Unknown time   Losartan Potassium 100 MG Oral Tab Take 100 mg by mouth daily.    Disp:  Rfl:  8/5/2017 at Unknown time 08/06/2017    08/06/2017   CO2 26.0 08/06/2017    08/06/2017   CA 8.8 08/06/2017   ALB 3.6 08/06/2017   ALKPHO 80 08/06/2017   BILT 0.3 08/06/2017   TP 7.5 08/06/2017   AST 25 08/06/2017   ALT 36 08/06/2017   INR 2.85 08/06/2017   PTP 30.5 08/ There is mild mixed atherosclerotic plaque in the abdominal aorta. No evidence of focal segmental stenosis. The celiac axis, superior mesenteric artery, renal arteries, and inferior mesenteric artery are patent.  There is mild ex plaque   at the origins of Elyssa Casillas MD on 8/06/2017 at [facility] / PLAN:     1. Chest pain rule out possible unstable angina -rule out with 3 sets of cardiac enzymes. Seen by cardiology who plans stress test in morning  2.  CAD with previous history of CABG and stent–contin

## 2017-08-06 NOTE — HISTORICAL OFFICE NOTE
Lazaro Whitaker  : 1955  ACCOUNT:  [id number]  [phone]  PCP: Dr. Shanika Valerio     TODAY'S DATE: 2017  DICTATED BY:  Beata Shaffer M.D. ]    CHIEF COMPLAINT: [Followup of . CAD, of native vessels, Followup of Cardiomyopathy and ischemic. ] occasional. ALCOHOL: denies drinking. EXERCISE: no regular exercise. DIET: no special diet. MARITAL STATUS: . OCCUPATION: retired.      ALLERGIES: Penicillins - CLASS    MEDICATIONS: Selected prescriptions see below    VITAL SIGNS: [B/P - 130/80 , Pu 10/04/16 Omeprazole            20MG      1 po daily                               10/04/16 Potassium Chloride [id number]     1 po daily                               09/15/16 CloNIDine HCl         0.2MG     1 po daily

## 2017-08-06 NOTE — ED PROVIDER NOTES
Patient Seen in: [facility] Emergency Department    History   Patient presents with:  Chest Pain Angina (cardiovascular)    Stated Complaint: CHEST PAIN    HPI    This is a 10-year-old male who has history of coronary disease, history of pulmonary e HCl 500 MG Oral Tab,  Take 500 mg by mouth 2 (two) times daily with meals. Omega-3 Fatty Acids (FISH OIL) 500 MG Oral Cap,  Take 500 mg by mouth 2 (two) times daily.    acetaminophen 500 MG Oral Chew Tab,  Chew 500 mg by mouth every 6 (six) hours as neede HPI.  Constitutional and vital signs reviewed. All other systems reviewed and negative except as noted above. PSFH elements reviewed from today and agreed except as otherwise stated in HPI.     Physical Exam   ED Triage Vitals [08/06/17 0853]  BP: ( panel order CBC WITH DIFFERENTIAL WITH PLATELET.   Procedure                               Abnormality         Status                     ---------                               -----------         ------                     CBC W/ DIFFERENTIAL[[id number]] specified.     Medications Prescribed:  Current Discharge Medication List        Present on Admission  Date Reviewed: 11/6/2016          ICD-10-CM Noted POA    Acute chest pain R07.9 9/2/2016 Unknown

## 2017-08-07 ENCOUNTER — PRIOR ORIGINAL RECORDS (OUTPATIENT)
Dept: OTHER | Age: 62
End: 2017-08-07

## 2017-08-07 ENCOUNTER — APPOINTMENT (OUTPATIENT)
Dept: CV DIAGNOSTICS | Facility: HOSPITAL | Age: 62
End: 2017-08-07
Attending: INTERNAL MEDICINE
Payer: MEDICARE

## 2017-08-07 VITALS
OXYGEN SATURATION: 99 % | TEMPERATURE: 98 F | RESPIRATION RATE: 20 BRPM | HEIGHT: 69 IN | SYSTOLIC BLOOD PRESSURE: 114 MMHG | WEIGHT: 304.25 LBS | HEART RATE: 63 BPM | DIASTOLIC BLOOD PRESSURE: 69 MMHG | BODY MASS INDEX: 45.06 KG/M2

## 2017-08-07 LAB
BASOPHILS # BLD AUTO: 0.03 X10(3) UL (ref 0–0.1)
BASOPHILS NFR BLD AUTO: 0.5 %
BUN BLD-MCNC: 16 MG/DL (ref 8–20)
CALCIUM BLD-MCNC: 8.2 MG/DL (ref 8.3–10.3)
CHLORIDE: 104 MMOL/L (ref 101–111)
CHOLEST SMN-MCNC: 139 MG/DL (ref ?–200)
CO2: 27 MMOL/L (ref 22–32)
CREAT BLD-MCNC: 1.14 MG/DL (ref 0.7–1.3)
EOSINOPHIL # BLD AUTO: 0.09 X10(3) UL (ref 0–0.3)
EOSINOPHIL NFR BLD AUTO: 1.4 %
ERYTHROCYTE [DISTWIDTH] IN BLOOD BY AUTOMATED COUNT: 15.7 % (ref 11.5–16)
GLUCOSE BLD-MCNC: 116 MG/DL (ref 65–99)
GLUCOSE BLD-MCNC: 119 MG/DL (ref 65–99)
GLUCOSE BLD-MCNC: 92 MG/DL (ref 70–99)
HCT VFR BLD AUTO: 35.7 % (ref 37–53)
HDLC SERPL-MCNC: 38 MG/DL (ref 45–?)
HDLC SERPL: 3.66 {RATIO} (ref ?–4.97)
HGB BLD-MCNC: 11.6 G/DL (ref 13–17)
IMMATURE GRANULOCYTE COUNT: 0.01 X10(3) UL (ref 0–1)
IMMATURE GRANULOCYTE RATIO %: 0.2 %
INR BLD: 2.61 (ref 0.89–1.11)
LDLC SERPL CALC-MCNC: 35 MG/DL (ref ?–130)
LDLC SERPL-MCNC: 66 MG/DL (ref 5–40)
LYMPHOCYTES # BLD AUTO: 1.47 X10(3) UL (ref 0.9–4)
LYMPHOCYTES NFR BLD AUTO: 23.3 %
MCH RBC QN AUTO: 27.8 PG (ref 27–33.2)
MCHC RBC AUTO-ENTMCNC: 32.5 G/DL (ref 31–37)
MCV RBC AUTO: 85.6 FL (ref 80–99)
MONOCYTES # BLD AUTO: 0.46 X10(3) UL (ref 0.1–0.6)
MONOCYTES NFR BLD AUTO: 7.3 %
NEUTROPHIL ABS PRELIM: 4.25 X10 (3) UL (ref 1.3–6.7)
NEUTROPHILS # BLD AUTO: 4.25 X10(3) UL (ref 1.3–6.7)
NEUTROPHILS NFR BLD AUTO: 67.3 %
NONHDLC SERPL-MCNC: 101 MG/DL (ref ?–130)
PLATELET # BLD AUTO: 248 10(3)UL (ref 150–450)
POTASSIUM SERPL-SCNC: 3.6 MMOL/L (ref 3.6–5.1)
PSA SERPL DL<=0.01 NG/ML-MCNC: 28.4 SECONDS (ref 12–14.3)
RBC # BLD AUTO: 4.17 X10(6)UL (ref 4.3–5.7)
RED CELL DISTRIBUTION WIDTH-SD: 48.9 FL (ref 35.1–46.3)
SODIUM SERPL-SCNC: 139 MMOL/L (ref 136–144)
TRIGLYCERIDES: 330 MG/DL (ref ?–150)
TROPONIN: <0.046 NG/ML (ref ?–0.05)
WBC # BLD AUTO: 6.3 X10(3) UL (ref 4–13)

## 2017-08-07 PROCEDURE — 78452 HT MUSCLE IMAGE SPECT MULT: CPT | Performed by: INTERNAL MEDICINE

## 2017-08-07 PROCEDURE — 93017 CV STRESS TEST TRACING ONLY: CPT | Performed by: INTERNAL MEDICINE

## 2017-08-07 PROCEDURE — 99217 OBSERVATION CARE DISCHARGE: CPT | Performed by: INTERNAL MEDICINE

## 2017-08-07 PROCEDURE — 93018 CV STRESS TEST I&R ONLY: CPT | Performed by: INTERNAL MEDICINE

## 2017-08-07 RX ORDER — AMINOPHYLLINE DIHYDRATE 25 MG/ML
INJECTION, SOLUTION INTRAVENOUS
Status: COMPLETED
Start: 2017-08-07 | End: 2017-08-07

## 2017-08-07 RX ORDER — POTASSIUM CHLORIDE 20 MEQ/1
40 TABLET, EXTENDED RELEASE ORAL EVERY 4 HOURS
Status: COMPLETED | OUTPATIENT
Start: 2017-08-07 | End: 2017-08-07

## 2017-08-07 NOTE — PROGRESS NOTES
[facility]  Cardiology Progress Note    Subjective:  No chest pain or shortness of breath. Resting quietly in bed.     Objective:  /67 (BP Location: Left arm)   Pulse 63   Temp 97.8 °F (36.6 °C) (Oral)   Resp 18   Ht 175.3 cm (5' 9\")   Wt (!) 3 anterior zone of ischemia  Pt does not think his discomfort was cardiac  I think we can d/c home , if recurrent pain will need cath  Elyse Ramos

## 2017-08-07 NOTE — CM/SW NOTE
Patient states he and spouse are fully independent.  Deny needs/concerns for d/ 08/07/17 1500   CM/SW Referral Data   Referral Source    Reason for Referral Discharge planning   Informant Patient   Pertinent Medical Hx   Primary Care Physici

## 2017-08-07 NOTE — PAYOR COMM NOTE
--------------  ADMISSION REVIEW     Payor: MEDICARE PART A&B  Subscriber #:  [id number]  Authorization Number: N/A    Admit date: N/A  Admit time: N/A       Admitting Physician: Malu Gomez MD  Attending Physician:  Panchito Plasencia MD  Primary Care Phys The abdomen is soft nondistended nontender. There is no rebound. There is no guarding. EXT: There is good pulses bilaterally. There is no calf tenderness. There is no rash noted. There is no edema    NEURO: Alert and oriented x3.   Muscle strength a Janie Auguste MD   Hosp Day # 0 PCP Bev Magdaleno MD     Chief Complaint:[MJ.1] Chest pain[MJ.2]    History of Present Illness:  Katherine Wilson is a 59year old male admitted with[MJ.1] chest pain.   Started today morning around 6 AM.  Substernal chest pain on 25 08/06/2017   ALT 36 08/06/2017   INR 2.85 08/06/2017   PTP 30.5 08/06/2017   TROP <0.046 08/06/2017   PGLU 125 08/06/2017       Recent Labs   Lab  08/06/17   0856   PTP  30.5*   INR  2.85*       Recent Labs   Lab  08/06/17   0856   TROP  <0.046     Imag Given 80 mg Oral Heidi Cancer, RN      carvedilol (COREG) tab 25 mg     Date Action Dose Route User    8/7/2017 0900 Given 25 mg Oral Yuliana Blocker, RN    8/6/2017 1846 Given 25 mg Oral Yuliana Blocker, RN      Clopidogrel Bisulfate (BERNICE mg     Date Action Dose Route User    8/7/2017 0901 Given 20 mg Oral Ralph Dorman RN    8/6/2017 1559 Given 20 mg Oral Ralph Dorman RN      CloNIDine HCl (CATAPRES) tab 0.3 mg     Date Action Dose Route User    8/7/2017 0900 Given 0.3 mg Oral

## 2017-08-07 NOTE — DISCHARGE SUMMARY
[facility]  Discharge Summary    Jose Eduardo Wilhelm Patient Status:  Observation    1955 MRN [id number]   [facility] 8NE-A Attending Marjorie Morrison MD   [address] Day # 0 PCP Stephanie Armas MD     Date of Admission: 2017    Date of D while in the hospital. Seen by cardiology who did Lexiscan stress test today which showed LVEF 32%. Large fixed defect inferior wall, apex and lateral wall. Small reversible area anterior wall.   Films reviewed by Dr. Quoc Garg cardiology who is planning to d mg by mouth 2 (two) times daily with meals. Refills:  0     CloNIDine HCl 0.2 MG Tabs  Commonly known as:  CATAPRES      Take 0.3 mg by mouth daily.    Refills:  0     Clopidogrel Bisulfate 75 MG Tabs  Commonly known as:  PLAVIX      Take 75 mg by mouth d regular routine. please return to the hospital if you have recurrent pain.      MD Jose Bajwa [id number]    In 1 week      Follow up Labs: INR in 1 week Melvin Torres MD regular primary care physician in offic

## 2017-08-07 NOTE — PROGRESS NOTES
CARDIODIAGNOSTIC PRELIMINARY REPORT:     LEXISCAN injection completed with no new EKG changes noted; no arrhythmias     Base: 116/74, HR 55;  Peak: 112/80, HR 63  Second set of images pending    Nuclear results dr. Liliam Acosta:  LVEF 32%.  Large fixed defect infe

## 2017-08-08 NOTE — PROGRESS NOTES
PT DISCHARGED HOME AS ORDERED/SCHEDULED BY CARDIOLOGY AND HOSPITALIST. DISCHARGE INSTRUCTIONS PROVIDED TO PT, PT VERBALIZED UNDERSTANDING OF INSTRUCTIONS. TELE MONITOR OFF A RETURNED. IV DC'D PRIOR TO DISCHARGE.  PT ACCOMPANIED BY TRANSPORT FOR D/C HOME VIA

## 2017-08-16 ENCOUNTER — HOSPITAL ENCOUNTER (INPATIENT)
Facility: HOSPITAL | Age: 62
LOS: 2 days | Discharge: HOME | DRG: 286 | End: 2017-08-19
Attending: EMERGENCY MEDICINE | Admitting: HOSPITALIST
Payer: MEDICARE

## 2017-08-16 ENCOUNTER — APPOINTMENT (OUTPATIENT)
Dept: GENERAL RADIOLOGY | Facility: HOSPITAL | Age: 62
DRG: 286 | End: 2017-08-16
Payer: MEDICARE

## 2017-08-16 ENCOUNTER — PRIOR ORIGINAL RECORDS (OUTPATIENT)
Dept: OTHER | Age: 62
End: 2017-08-16

## 2017-08-16 DIAGNOSIS — R07.9 ACUTE CHEST PAIN: ICD-10-CM

## 2017-08-16 DIAGNOSIS — I50.9 CONGESTIVE HEART FAILURE, UNSPECIFIED CONGESTIVE HEART FAILURE CHRONICITY, UNSPECIFIED CONGESTIVE HEART FAILURE TYPE: Primary | ICD-10-CM

## 2017-08-16 LAB
ALBUMIN SERPL-MCNC: 3.7 G/DL (ref 3.5–4.8)
ALP LIVER SERPL-CCNC: 78 U/L (ref 45–117)
ALT SERPL-CCNC: 33 U/L (ref 17–63)
APTT PPP: 26.8 SECONDS (ref 25–34)
AST SERPL-CCNC: 22 U/L (ref 15–41)
BASOPHILS # BLD AUTO: 0.03 X10(3) UL (ref 0–0.1)
BASOPHILS NFR BLD AUTO: 0.3 %
BILIRUB SERPL-MCNC: 0.5 MG/DL (ref 0.1–2)
BUN BLD-MCNC: 16 MG/DL (ref 8–20)
CALCIUM BLD-MCNC: 9 MG/DL (ref 8.3–10.3)
CHLORIDE: 106 MMOL/L (ref 101–111)
CO2: 23 MMOL/L (ref 22–32)
CREAT BLD-MCNC: 1.26 MG/DL (ref 0.7–1.3)
EOSINOPHIL # BLD AUTO: 0.02 X10(3) UL (ref 0–0.3)
EOSINOPHIL NFR BLD AUTO: 0.2 %
ERYTHROCYTE [DISTWIDTH] IN BLOOD BY AUTOMATED COUNT: 15.5 % (ref 11.5–16)
GLUCOSE BLD-MCNC: 123 MG/DL (ref 70–99)
HCT VFR BLD AUTO: 39.4 % (ref 37–53)
HGB BLD-MCNC: 12.8 G/DL (ref 13–17)
IMMATURE GRANULOCYTE COUNT: 0.03 X10(3) UL (ref 0–1)
IMMATURE GRANULOCYTE RATIO %: 0.3 %
INR BLD: 1.57 (ref 0.89–1.11)
LYMPHOCYTES # BLD AUTO: 1.45 X10(3) UL (ref 0.9–4)
LYMPHOCYTES NFR BLD AUTO: 15.4 %
M PROTEIN MFR SERPL ELPH: 8.1 G/DL (ref 6.1–8.3)
MCH RBC QN AUTO: 27.9 PG (ref 27–33.2)
MCHC RBC AUTO-ENTMCNC: 32.5 G/DL (ref 31–37)
MCV RBC AUTO: 86 FL (ref 80–99)
MONOCYTES # BLD AUTO: 0.55 X10(3) UL (ref 0.1–0.6)
MONOCYTES NFR BLD AUTO: 5.8 %
NEUTROPHIL ABS PRELIM: 7.36 X10 (3) UL (ref 1.3–6.7)
NEUTROPHILS # BLD AUTO: 7.36 X10(3) UL (ref 1.3–6.7)
NEUTROPHILS NFR BLD AUTO: 78 %
PLATELET # BLD AUTO: 304 10(3)UL (ref 150–450)
POTASSIUM SERPL-SCNC: 3.6 MMOL/L (ref 3.6–5.1)
PRO-BETA NATRIURETIC PEPTIDE: 961 PG/ML (ref ?–125)
PSA SERPL DL<=0.01 NG/ML-MCNC: 18.9 SECONDS (ref 12–14.3)
RBC # BLD AUTO: 4.58 X10(6)UL (ref 4.3–5.7)
RED CELL DISTRIBUTION WIDTH-SD: 48.2 FL (ref 35.1–46.3)
SODIUM SERPL-SCNC: 140 MMOL/L (ref 136–144)
TROPONIN: 0.2 NG/ML (ref ?–0.05)
WBC # BLD AUTO: 9.4 X10(3) UL (ref 4–13)

## 2017-08-16 PROCEDURE — 71010 XR CHEST AP PORTABLE  (CPT=71010): CPT | Performed by: EMERGENCY MEDICINE

## 2017-08-16 PROCEDURE — 71010 XR CHEST AP PORTABLE  (CPT=71010): CPT

## 2017-08-16 RX ORDER — NITROGLYCERIN 0.4 MG/1
TABLET SUBLINGUAL
Status: DISPENSED
Start: 2017-08-16 | End: 2017-08-17

## 2017-08-16 RX ORDER — NITROGLYCERIN 0.4 MG/1
0.4 TABLET SUBLINGUAL ONCE
Status: COMPLETED | OUTPATIENT
Start: 2017-08-16 | End: 2017-08-16

## 2017-08-16 RX ORDER — FUROSEMIDE 10 MG/ML
40 INJECTION INTRAMUSCULAR; INTRAVENOUS ONCE
Status: COMPLETED | OUTPATIENT
Start: 2017-08-16 | End: 2017-08-16

## 2017-08-16 RX ORDER — FUROSEMIDE 10 MG/ML
INJECTION INTRAMUSCULAR; INTRAVENOUS
Status: DISPENSED
Start: 2017-08-16 | End: 2017-08-17

## 2017-08-17 ENCOUNTER — APPOINTMENT (OUTPATIENT)
Dept: CT IMAGING | Facility: HOSPITAL | Age: 62
DRG: 286 | End: 2017-08-17
Attending: INTERNAL MEDICINE
Payer: MEDICARE

## 2017-08-17 PROBLEM — G47.33 OSA (OBSTRUCTIVE SLEEP APNEA): Status: ACTIVE | Noted: 2017-03-16

## 2017-08-17 PROBLEM — I50.9 CONGESTIVE HEART FAILURE, UNSPECIFIED CONGESTIVE HEART FAILURE CHRONICITY, UNSPECIFIED CONGESTIVE HEART FAILURE TYPE: Status: ACTIVE | Noted: 2017-08-17

## 2017-08-17 PROBLEM — I50.9 CONGESTIVE HEART FAILURE (HCC): Status: ACTIVE | Noted: 2017-08-17

## 2017-08-17 PROBLEM — I50.9 CHF (CONGESTIVE HEART FAILURE) (HCC): Status: ACTIVE | Noted: 2017-08-17

## 2017-08-17 LAB
APTT PPP: 33.9 SECONDS (ref 25–34)
APTT PPP: 35.7 SECONDS (ref 25–34)
APTT PPP: 42 SECONDS (ref 25–34)
APTT PPP: 46.2 SECONDS (ref 25–34)
ATRIAL RATE: 100 BPM
BUN BLD-MCNC: 14 MG/DL (ref 8–20)
CALCIUM BLD-MCNC: 8.5 MG/DL (ref 8.3–10.3)
CHLORIDE: 105 MMOL/L (ref 101–111)
CK: 142 IU/L (ref 39–308)
CO2: 26 MMOL/L (ref 22–32)
CREAT BLD-MCNC: 1.06 MG/DL (ref 0.7–1.3)
D-DIMER: <0.27 UG/ML FEU (ref 0–0.49)
ERYTHROCYTE [DISTWIDTH] IN BLOOD BY AUTOMATED COUNT: 15.4 % (ref 11.5–16)
GLUCOSE BLD-MCNC: 108 MG/DL (ref 65–99)
GLUCOSE BLD-MCNC: 115 MG/DL (ref 65–99)
GLUCOSE BLD-MCNC: 120 MG/DL (ref 65–99)
GLUCOSE BLD-MCNC: 127 MG/DL (ref 70–99)
GLUCOSE BLD-MCNC: 134 MG/DL (ref 65–99)
HCT VFR BLD AUTO: 36.6 % (ref 37–53)
HGB BLD-MCNC: 12.2 G/DL (ref 13–17)
INR BLD: 1.61 (ref 0.89–1.11)
MCH RBC QN AUTO: 28.2 PG (ref 27–33.2)
MCHC RBC AUTO-ENTMCNC: 33.3 G/DL (ref 31–37)
MCV RBC AUTO: 84.7 FL (ref 80–99)
P AXIS: 40 DEGREES
P-R INTERVAL: 166 MS
PLATELET # BLD AUTO: 272 10(3)UL (ref 150–450)
POTASSIUM SERPL-SCNC: 3.5 MMOL/L (ref 3.6–5.1)
PROCALCITONIN SERPL-MCNC: <0.11 NG/ML (ref ?–0.11)
PSA SERPL DL<=0.01 NG/ML-MCNC: 19.3 SECONDS (ref 12–14.3)
Q-T INTERVAL: 386 MS
QRS DURATION: 94 MS
QTC CALCULATION (BEZET): 497 MS
R AXIS: 21 DEGREES
RBC # BLD AUTO: 4.32 X10(6)UL (ref 4.3–5.7)
RED CELL DISTRIBUTION WIDTH-SD: 47.1 FL (ref 35.1–46.3)
SODIUM SERPL-SCNC: 140 MMOL/L (ref 136–144)
T AXIS: 55 DEGREES
TROPONIN: 0.42 NG/ML (ref ?–0.05)
VENTRICULAR RATE: 100 BPM
WBC # BLD AUTO: 7.5 X10(3) UL (ref 4–13)

## 2017-08-17 PROCEDURE — 5A09357 ASSISTANCE WITH RESPIRATORY VENTILATION, LESS THAN 24 CONSECUTIVE HOURS, CONTINUOUS POSITIVE AIRWAY PRESSURE: ICD-10-PCS | Performed by: HOSPITALIST

## 2017-08-17 PROCEDURE — 99233 SBSQ HOSP IP/OBS HIGH 50: CPT | Performed by: HOSPITALIST

## 2017-08-17 PROCEDURE — 99223 1ST HOSP IP/OBS HIGH 75: CPT | Performed by: HOSPITALIST

## 2017-08-17 PROCEDURE — 71275 CT ANGIOGRAPHY CHEST: CPT | Performed by: INTERNAL MEDICINE

## 2017-08-17 RX ORDER — HEPARIN SODIUM 5000 [USP'U]/ML
3000 INJECTION INTRAVENOUS; SUBCUTANEOUS ONCE
Status: COMPLETED | OUTPATIENT
Start: 2017-08-17 | End: 2017-08-17

## 2017-08-17 RX ORDER — AMIODARONE HYDROCHLORIDE 200 MG/1
200 TABLET ORAL DAILY
Status: DISCONTINUED | OUTPATIENT
Start: 2017-08-17 | End: 2017-08-19

## 2017-08-17 RX ORDER — ISOSORBIDE MONONITRATE 60 MG/1
60 TABLET, EXTENDED RELEASE ORAL NIGHTLY
Status: DISCONTINUED | OUTPATIENT
Start: 2017-08-17 | End: 2017-08-19

## 2017-08-17 RX ORDER — ATORVASTATIN CALCIUM 80 MG/1
80 TABLET, FILM COATED ORAL NIGHTLY
Status: DISCONTINUED | OUTPATIENT
Start: 2017-08-17 | End: 2017-08-19

## 2017-08-17 RX ORDER — PANTOPRAZOLE SODIUM 20 MG/1
20 TABLET, DELAYED RELEASE ORAL NIGHTLY
Status: DISCONTINUED | OUTPATIENT
Start: 2017-08-17 | End: 2017-08-19

## 2017-08-17 RX ORDER — ASPIRIN 325 MG
325 TABLET ORAL DAILY
Status: DISCONTINUED | OUTPATIENT
Start: 2017-08-17 | End: 2017-08-19

## 2017-08-17 RX ORDER — WARFARIN SODIUM 2 MG/1
4 TABLET ORAL
Status: DISCONTINUED | OUTPATIENT
Start: 2017-08-17 | End: 2017-08-17

## 2017-08-17 RX ORDER — ONDANSETRON 2 MG/ML
4 INJECTION INTRAMUSCULAR; INTRAVENOUS EVERY 6 HOURS PRN
Status: DISCONTINUED | OUTPATIENT
Start: 2017-08-17 | End: 2017-08-19

## 2017-08-17 RX ORDER — CLONIDINE HYDROCHLORIDE 0.3 MG/1
0.3 TABLET ORAL DAILY
COMMUNITY

## 2017-08-17 RX ORDER — HEPARIN SODIUM AND DEXTROSE 10000; 5 [USP'U]/100ML; G/100ML
1000 INJECTION INTRAVENOUS ONCE
Status: COMPLETED | OUTPATIENT
Start: 2017-08-17 | End: 2017-08-17

## 2017-08-17 RX ORDER — ACETAMINOPHEN 325 MG/1
650 TABLET ORAL EVERY 6 HOURS PRN
Status: DISCONTINUED | OUTPATIENT
Start: 2017-08-17 | End: 2017-08-19

## 2017-08-17 RX ORDER — SERTRALINE HYDROCHLORIDE 100 MG/1
100 TABLET, FILM COATED ORAL DAILY
Status: DISCONTINUED | OUTPATIENT
Start: 2017-08-17 | End: 2017-08-19

## 2017-08-17 RX ORDER — NITROGLYCERIN 20 MG/100ML
50 INJECTION INTRAVENOUS CONTINUOUS
Status: DISCONTINUED | OUTPATIENT
Start: 2017-08-17 | End: 2017-08-19

## 2017-08-17 RX ORDER — DEXTROSE MONOHYDRATE 25 G/50ML
50 INJECTION, SOLUTION INTRAVENOUS
Status: DISCONTINUED | OUTPATIENT
Start: 2017-08-17 | End: 2017-08-19

## 2017-08-17 RX ORDER — ENOXAPARIN SODIUM 150 MG/ML
1 INJECTION SUBCUTANEOUS EVERY 12 HOURS SCHEDULED
Status: CANCELLED | OUTPATIENT
Start: 2017-08-17

## 2017-08-17 RX ORDER — HEPARIN SODIUM AND DEXTROSE 10000; 5 [USP'U]/100ML; G/100ML
INJECTION INTRAVENOUS CONTINUOUS
Status: DISCONTINUED | OUTPATIENT
Start: 2017-08-17 | End: 2017-08-19

## 2017-08-17 RX ORDER — LOSARTAN POTASSIUM 100 MG/1
100 TABLET ORAL DAILY
Status: DISCONTINUED | OUTPATIENT
Start: 2017-08-17 | End: 2017-08-19

## 2017-08-17 RX ORDER — HYDROCODONE BITARTRATE AND ACETAMINOPHEN 10; 325 MG/1; MG/1
1 TABLET ORAL EVERY 6 HOURS PRN
Status: DISCONTINUED | OUTPATIENT
Start: 2017-08-17 | End: 2017-08-19

## 2017-08-17 RX ORDER — NITROGLYCERIN 0.4 MG/1
0.4 TABLET SUBLINGUAL EVERY 5 MIN PRN
Status: DISCONTINUED | OUTPATIENT
Start: 2017-08-17 | End: 2017-08-19

## 2017-08-17 RX ORDER — FUROSEMIDE 10 MG/ML
40 INJECTION INTRAMUSCULAR; INTRAVENOUS DAILY
Status: DISCONTINUED | OUTPATIENT
Start: 2017-08-17 | End: 2017-08-19

## 2017-08-17 RX ORDER — MEXILETINE HYDROCHLORIDE 150 MG/1
150 CAPSULE ORAL 2 TIMES DAILY
Status: DISCONTINUED | OUTPATIENT
Start: 2017-08-17 | End: 2017-08-19

## 2017-08-17 RX ORDER — MONTELUKAST SODIUM 10 MG/1
10 TABLET ORAL DAILY
Status: DISCONTINUED | OUTPATIENT
Start: 2017-08-17 | End: 2017-08-19

## 2017-08-17 RX ORDER — CARVEDILOL 12.5 MG/1
25 TABLET ORAL 2 TIMES DAILY WITH MEALS
Status: DISCONTINUED | OUTPATIENT
Start: 2017-08-17 | End: 2017-08-19

## 2017-08-17 RX ORDER — CLOPIDOGREL BISULFATE 75 MG/1
75 TABLET ORAL DAILY
Status: DISCONTINUED | OUTPATIENT
Start: 2017-08-17 | End: 2017-08-19

## 2017-08-17 NOTE — H&P
[facility]  History and Physical     Rufina Countess Patient Status:  Emergency    1955 MRN [id number]   Location [address] [facility] Attending Debora Layne MD   Hosp Day # 0 PCP Monik Simon MD     Chief Complaint: History:  reports that he has never smoked. He has never used smokeless tobacco. He reports that he does not drink alcohol or use drugs.     Family History:   Family History   Problem Relation Age of Onset   • Heart Disease Father    • Stroke Father    • Ca tablet (20 mEq total) by mouth daily. Disp: 30 tablet Rfl: 1   Montelukast Sodium 10 MG Oral Tab Take 10 mg by mouth daily. Disp:  Rfl:    Losartan Potassium 100 MG Oral Tab Take 100 mg by mouth daily.    Disp:  Rfl:    carvedilol 25 MG Oral Tab Take 25 m Imaging: Imaging data reviewed in Epic. ASSESSMENT / PLAN:     1. Dyspnea presumed secondary to acute on chronic combined systolic/diastolic heart failure  1. Continue IV diuresis   2. Recent nuclear stress test noted  3.  Continue BB/ARB  4. Car

## 2017-08-17 NOTE — PLAN OF CARE
Assumed care 0130 from ER. Heparin, NTG gtt, BiPap. A&O X4, ATKINS, following commands. Monitor shows NSR. INR 1.5, gave 4mg coumadin 0145. Denies CP or SOB. Lung sounds diminished/crackles. Generalized edema. Denies pain.  See flow sheet for detailed assessme

## 2017-08-17 NOTE — ED PROVIDER NOTES
Patient Seen in: [facility] Emergency Department    History   Patient presents with:  Dyspnea ERMIAS SOB (respiratory)    Stated Complaint: dyspnea with productive cough - no fevers    HPI    Patient is a 58-year-old male with multiple past medical pro times daily with meals. Omega-3 Fatty Acids (FISH OIL) 500 MG Oral Cap,  Take 500 mg by mouth 2 (two) times daily. acetaminophen 500 MG Oral Chew Tab,  Chew 500 mg by mouth every 6 (six) hours as needed for Pain.    Mexiletine HCl 150 MG Oral Cap,  Take except as noted above. PSFH elements reviewed from today and agreed except as otherwise stated in HPI. Physical Exam   ED Triage Vitals [08/16/17 2302]  BP: (!) 156/102  Pulse: 102  Resp: 28  Temp: (!) 96.3 °F (35.7 °C)  Temp src: Temporal  SpO2: Roetta Arnold ) components within normal limits   CBC W/ DIFFERENTIAL - Abnormal; Notable for the following:     HGB 12.8 (*)     RDW-SD 48.2 (*)     Neutrophil Absolute Prelim 7.36 (*)     Neutrophil Absolute 7.36 (*)     All other components within normal limits   PTT, telemetry. Blood was obtained and peripheral IV access was established. He was given sublingual nitroglycerin and IV Lasix. He was started on BiPAP. Chest x-ray was obtained. MDM     CONCLUSION:    1. Development of mild pulmonary vascular congestion. obesity with BMI of 40.0-44.9, adult ([facility]) E66.01, Z68.41 Unknown Unknown

## 2017-08-17 NOTE — HISTORICAL OFFICE NOTE
[facility]  [phone]  : 1955  ACCOUNT: [id number]  PCP: Noe Oquendo M.D. CARDIOLOGIST: Ritu Krishna M.D. Hospital: [facility]  Admitted: 00  Discharged: 2017    DISCHARGE SUMMARY    DISCHARGE DIAGNOSES:  1.   Chest pain, aty recently seen by Dr. Kely Ness from the EP service, and mexiletine was added. However, he says he can not afford it, so I need to track down Dr. Kely Ness and talk to him about this.  He also has some concern about a cellular device for his ICD so we will try to get th RESP: respirations with normal rate and rhythm, clear to auscultation. CHEST/BREASTS: healed sternotomy. GI: no masses, tenderness or hepatosplenomegaly, rectal deferred. MS: adequate gait for exercise/testing. EXT: no clubbing or cyanosis.   SKIN: no rashe 75MG      1 po daily                                 Addendum: I spoke with Dr. Jaki Rondon and we are going to increase his amiodarone to 200 mg twice daily for a month and then have him followup with Dr. Jaki Rondon. VINAY Maria

## 2017-08-17 NOTE — PROGRESS NOTES
[facility]  Progress Note     Rosalind Linda Patient Status:  Inpatient    1955 MRN [id number]   [facility] 6NE-A Attending Adarsh Grady MD   [facility] Day # 0 PCP Noe Oquendo MD     Chief Complaint: SOB    S: Patient report 60 mg Oral Nightly   • losartan  100 mg Oral Daily   • Mexiletine HCl  150 mg Oral BID   • Montelukast Sodium  10 mg Oral Daily   • Pantoprazole Sodium  20 mg Oral Nightly   • Sertraline HCl  100 mg Oral Daily   • Warfarin Sodium  4 mg Oral Once per day on examined. Gen: NAD  CVS: s1s2  Resp: CTA  Abd: soft    -cont.  Heparin/nitro  -await results of CTA, possible cath if negative    Sada Abreu MD

## 2017-08-17 NOTE — DIETARY NOTE
Nutrition Short Note    Dietitian consult received for heart failure education. Appropriate education and handout provided (Nutrition Therapy for heart failure). Pt verbalized understanding and all questions answered. RD available PRN.     Vaishali Murillo

## 2017-08-18 ENCOUNTER — APPOINTMENT (OUTPATIENT)
Dept: CV DIAGNOSTICS | Facility: HOSPITAL | Age: 62
DRG: 286 | End: 2017-08-18
Attending: INTERNAL MEDICINE
Payer: MEDICARE

## 2017-08-18 ENCOUNTER — PRIOR ORIGINAL RECORDS (OUTPATIENT)
Dept: OTHER | Age: 62
End: 2017-08-18

## 2017-08-18 ENCOUNTER — APPOINTMENT (OUTPATIENT)
Dept: INTERVENTIONAL RADIOLOGY/VASCULAR | Facility: HOSPITAL | Age: 62
DRG: 286 | End: 2017-08-18
Attending: INTERNAL MEDICINE
Payer: MEDICARE

## 2017-08-18 LAB
ALBUMIN SERPL-MCNC: 3.5 G/DL (ref 3.5–4.8)
ALP LIVER SERPL-CCNC: 68 U/L (ref 45–117)
ALT SERPL-CCNC: 28 U/L (ref 17–63)
APTT PPP: 55.8 SECONDS (ref 25–34)
AST SERPL-CCNC: 22 U/L (ref 15–41)
BILIRUB SERPL-MCNC: 0.5 MG/DL (ref 0.1–2)
BUN BLD-MCNC: 17 MG/DL (ref 8–20)
CALCIUM BLD-MCNC: 8.6 MG/DL (ref 8.3–10.3)
CHLORIDE: 105 MMOL/L (ref 101–111)
CO2: 24 MMOL/L (ref 22–32)
CREAT BLD-MCNC: 1.08 MG/DL (ref 0.7–1.3)
ERYTHROCYTE [DISTWIDTH] IN BLOOD BY AUTOMATED COUNT: 15.6 % (ref 11.5–16)
GLUCOSE BLD-MCNC: 103 MG/DL (ref 65–99)
GLUCOSE BLD-MCNC: 103 MG/DL (ref 70–99)
GLUCOSE BLD-MCNC: 104 MG/DL (ref 65–99)
GLUCOSE BLD-MCNC: 113 MG/DL (ref 65–99)
GLUCOSE BLD-MCNC: 114 MG/DL (ref 65–99)
HAV IGM SER QL: 2.3 MG/DL (ref 1.7–3)
HCT VFR BLD AUTO: 36.5 % (ref 37–53)
HGB BLD-MCNC: 11.8 G/DL (ref 13–17)
INR BLD: 1.77 (ref 0.89–1.11)
M PROTEIN MFR SERPL ELPH: 7.4 G/DL (ref 6.1–8.3)
MCH RBC QN AUTO: 27.8 PG (ref 27–33.2)
MCHC RBC AUTO-ENTMCNC: 32.3 G/DL (ref 31–37)
MCV RBC AUTO: 86.1 FL (ref 80–99)
PLATELET # BLD AUTO: 255 10(3)UL (ref 150–450)
POTASSIUM SERPL-SCNC: 3.3 MMOL/L (ref 3.6–5.1)
POTASSIUM SERPL-SCNC: 3.8 MMOL/L (ref 3.6–5.1)
PRO-BETA NATRIURETIC PEPTIDE: 619 PG/ML (ref ?–125)
PSA SERPL DL<=0.01 NG/ML-MCNC: 20.8 SECONDS (ref 12–14.3)
RBC # BLD AUTO: 4.24 X10(6)UL (ref 4.3–5.7)
RED CELL DISTRIBUTION WIDTH-SD: 48.7 FL (ref 35.1–46.3)
SODIUM SERPL-SCNC: 139 MMOL/L (ref 136–144)
TROPONIN: 0.4 NG/ML (ref ?–0.05)
WBC # BLD AUTO: 7 X10(3) UL (ref 4–13)

## 2017-08-18 PROCEDURE — 93306 TTE W/DOPPLER COMPLETE: CPT | Performed by: INTERNAL MEDICINE

## 2017-08-18 PROCEDURE — B2131ZZ FLUOROSCOPY OF MULTIPLE CORONARY ARTERY BYPASS GRAFTS USING LOW OSMOLAR CONTRAST: ICD-10-PCS | Performed by: INTERNAL MEDICINE

## 2017-08-18 PROCEDURE — 99232 SBSQ HOSP IP/OBS MODERATE 35: CPT | Performed by: HOSPITALIST

## 2017-08-18 PROCEDURE — B2111ZZ FLUOROSCOPY OF MULTIPLE CORONARY ARTERIES USING LOW OSMOLAR CONTRAST: ICD-10-PCS | Performed by: INTERNAL MEDICINE

## 2017-08-18 PROCEDURE — 4A023N7 MEASUREMENT OF CARDIAC SAMPLING AND PRESSURE, LEFT HEART, PERCUTANEOUS APPROACH: ICD-10-PCS | Performed by: INTERNAL MEDICINE

## 2017-08-18 RX ORDER — LORAZEPAM 2 MG/ML
0.5 INJECTION INTRAMUSCULAR ONCE
Status: COMPLETED | OUTPATIENT
Start: 2017-08-18 | End: 2017-08-18

## 2017-08-18 RX ORDER — MIDAZOLAM HYDROCHLORIDE 1 MG/ML
INJECTION INTRAMUSCULAR; INTRAVENOUS
Status: COMPLETED
Start: 2017-08-18 | End: 2017-08-18

## 2017-08-18 RX ORDER — LIDOCAINE HYDROCHLORIDE 10 MG/ML
INJECTION, SOLUTION INFILTRATION; PERINEURAL
Status: COMPLETED
Start: 2017-08-18 | End: 2017-08-18

## 2017-08-18 RX ORDER — HEPARIN SODIUM 5000 [USP'U]/ML
INJECTION, SOLUTION INTRAVENOUS; SUBCUTANEOUS
Status: COMPLETED
Start: 2017-08-18 | End: 2017-08-18

## 2017-08-18 RX ORDER — POTASSIUM CHLORIDE 20 MEQ/1
40 TABLET, EXTENDED RELEASE ORAL EVERY 4 HOURS
Status: COMPLETED | OUTPATIENT
Start: 2017-08-18 | End: 2017-08-18

## 2017-08-18 RX ORDER — MIDAZOLAM HYDROCHLORIDE 1 MG/ML
INJECTION INTRAMUSCULAR; INTRAVENOUS
Status: DISCONTINUED
Start: 2017-08-18 | End: 2017-08-18 | Stop reason: WASHOUT

## 2017-08-18 NOTE — PLAN OF CARE
Stat PTT drawn per ACS protocol - 35.7, subtherapeutic per ACS protocol  Increase heparin by 200u - from 1400u/hr to 1600u/hr  Recheck PTT in 6hrs - 0520    Will continue to monitor.

## 2017-08-18 NOTE — CM/SW NOTE
SW met with patient who stated, \"my situation at home isn't very good. \"  Patient shared with writer that he currently lives with his ex-wife, son, daughter-in-law, and daughter. Per patient, his family is \"mean\" to him.  Patient stated he plans to move

## 2017-08-18 NOTE — PROGRESS NOTES
[facility]  Progress Note     Peggy Magallon Patient Status:  Inpatient    1955 MRN [id number]   [facility] 6NE-A Attending Supa Deleon MD   [facility] Day # 1 PCP Kael Hernandes MD     Chief Complaint: SOB    S: Patient report reviewed in Epic.     Medications:   • Potassium Chloride ER  40 mEq Oral Q4H   • amiodarone HCl  200 mg Oral Daily   • atorvastatin  80 mg Oral Nightly   • carvedilol  25 mg Oral BID with meals   • CloNIDine HCl  0.3 mg Oral Daily   • Clopidogrel Bisulfate 1. PPI  13. S/p AICD    Quality:  · DVT Prophylaxis: coumadin, heparin drip, INR 1.77  · CODE status: full    Estimated date of discharge: tbd    Plan of care discussed with patient, RN.     Reyna Essex, [place]  11:03 AM     Addendum:    Louis Barbour

## 2017-08-18 NOTE — PROCEDURES
[facility]    Brief Cardiac Cath Note  Rosalind Mckeon Patient Status:  Inpatient    1955 MRN [id number]   [facility] 2NE-A Attending Adarsh Grady MD   [facility] Day # 1 PCP Noe Oquendo MD       Cardiologist: MD Ale Fernandez

## 2017-08-19 VITALS
TEMPERATURE: 98 F | SYSTOLIC BLOOD PRESSURE: 140 MMHG | DIASTOLIC BLOOD PRESSURE: 74 MMHG | BODY MASS INDEX: 44 KG/M2 | WEIGHT: 296.94 LBS | OXYGEN SATURATION: 96 % | RESPIRATION RATE: 18 BRPM | HEART RATE: 59 BPM

## 2017-08-19 LAB
APTT PPP: 30.1 SECONDS (ref 25–34)
GLUCOSE BLD-MCNC: 107 MG/DL (ref 65–99)
INR BLD: 1.67 (ref 0.89–1.11)
PLATELET # BLD AUTO: 208 10(3)UL (ref 150–450)
PSA SERPL DL<=0.01 NG/ML-MCNC: 19.9 SECONDS (ref 12–14.3)

## 2017-08-19 PROCEDURE — 99239 HOSP IP/OBS DSCHRG MGMT >30: CPT | Performed by: HOSPITALIST

## 2017-08-19 RX ORDER — ISOSORBIDE MONONITRATE 30 MG/1
90 TABLET, EXTENDED RELEASE ORAL NIGHTLY
Qty: 20 TABLET | Refills: 0 | Status: SHIPPED | OUTPATIENT
Start: 2017-08-19 | End: 2019-09-28 | Stop reason: CLARIF

## 2017-08-19 RX ORDER — ISOSORBIDE MONONITRATE 30 MG/1
90 TABLET, EXTENDED RELEASE ORAL NIGHTLY
Qty: 20 TABLET | Refills: 0 | Status: SHIPPED | COMMUNITY
Start: 2017-08-19 | End: 2017-08-19

## 2017-08-19 NOTE — PLAN OF CARE
Problem: CARDIOVASCULAR - ADULT  Goal: Maintains optimal cardiac output and hemodynamic stability  INTERVENTIONS:  - Monitor vital signs, rhythm, and trends  - Monitor for bleeding, hypotension and signs of decreased cardiac output  - Evaluate effectivenes ad sandoval in room. Discharge orders on chart. Discharge instructions reviewed with and given to Pt., and questions answered, Pt. Verbalized understanding. Pt. Discharged at this time, escorted to vehicle via SIENA Martell by transport, left with his wife and grandson.

## 2017-08-19 NOTE — PROGRESS NOTES
[facility] Progress Note    Subjective:  Had a good night.      Objective:  /85 (BP Location: Left arm)   Pulse 73   Temp 97.9 °F (36.6 °C) (Axillary)   Resp 18   Wt 296 lb 1.2 oz (134.3 kg)   SpO2 96%   BMI 43.72 kg/m²     Telemetry: SB-SR

## 2017-08-23 NOTE — CM/SW NOTE
08/23/17 0800   Discharge disposition   Discharged to: Home or Self   Patient Refuses Rehab Services Yes   Discharge transportation Private car

## 2017-08-23 NOTE — DISCHARGE SUMMARY
[facility] PSYCHIATRIC [place] HOSPITALIST  DISCHARGE SUMMARY     Omega Ballard Patient Status:  Inpatient    1955 MRN [id number]   [facility] 2NE-A Attending Pelon Erickson MD   [facility] Day # 2 PCP Tete Sandoval MD     Date of Admission: 2017  Date of non-productive cough. Was recently admitted earlier this month for chest pain and did have an abnormal nuclear stress test. Cardiology recommended medical management with possible cardiac cath in future if symptoms returned.      Brief Synopsis:     The pat tablet  Refills:  0     carvedilol 25 MG Tabs  Commonly known as:  COREG      Take 25 mg by mouth 2 (two) times daily with meals. Refills:  0     CloNIDine HCl 0.3 MG Tabs  Commonly known as:  CATAPRES      Take 0.3 mg by mouth daily.    Refills:  0     C medications were sent to [facility] [address], [address]    Phone:  [phone]   · Isosorbide Mononitrate ER 30 MG Tb24         Follow-up appointment: Bushra Louis

## 2017-08-24 ENCOUNTER — PRIOR ORIGINAL RECORDS (OUTPATIENT)
Dept: OTHER | Age: 62
End: 2017-08-24

## 2017-08-24 ENCOUNTER — MYAURORA ACCOUNT LINK (OUTPATIENT)
Dept: OTHER | Age: 62
End: 2017-08-24

## 2017-09-12 ENCOUNTER — MYAURORA ACCOUNT LINK (OUTPATIENT)
Dept: OTHER | Age: 62
End: 2017-09-12

## 2017-09-12 ENCOUNTER — PRIOR ORIGINAL RECORDS (OUTPATIENT)
Dept: OTHER | Age: 62
End: 2017-09-12

## 2017-09-14 LAB
ALBUMIN: 3.5 G/DL
ALKALINE PHOSPHATATE(ALK PHOS): 68 IU/L
BILIRUBIN TOTAL: 0.5 MG/DL
BUN: 17 MG/DL
CALCIUM: 8.6 MG/DL
CHLORIDE: 105 MEQ/L
CREATININE, SERUM: 1.08 MG/DL
GLUCOSE: 103 MG/DL
HEMATOCRIT: 36.5 %
HEMOGLOBIN: 11.8 G/DL
PLATELETS: 255 K/UL
POTASSIUM, SERUM: 3.3 MEQ/L
PROBNP: 619 PG/ML
PROBNP: 961 PG/ML
PROTEIN, TOTAL: 7.4 G/DL
RED BLOOD COUNT: 4.24 X 10-6/U
SGOT (AST): 22 IU/L
SGPT (ALT): 28 IU/L
SODIUM: 139 MEQ/L
WHITE BLOOD COUNT: 7 X 10-3/U

## 2017-10-08 ENCOUNTER — APPOINTMENT (OUTPATIENT)
Dept: CT IMAGING | Facility: HOSPITAL | Age: 62
End: 2017-10-08
Attending: EMERGENCY MEDICINE
Payer: MEDICARE

## 2017-10-08 ENCOUNTER — HOSPITAL ENCOUNTER (EMERGENCY)
Facility: HOSPITAL | Age: 62
Discharge: HOME | End: 2017-10-08
Attending: EMERGENCY MEDICINE
Payer: MEDICARE

## 2017-10-08 ENCOUNTER — PRIOR ORIGINAL RECORDS (OUTPATIENT)
Dept: OTHER | Age: 62
End: 2017-10-08

## 2017-10-08 VITALS
OXYGEN SATURATION: 98 % | RESPIRATION RATE: 16 BRPM | HEART RATE: 55 BPM | HEIGHT: 69 IN | SYSTOLIC BLOOD PRESSURE: 132 MMHG | BODY MASS INDEX: 42.95 KG/M2 | WEIGHT: 290 LBS | TEMPERATURE: 98 F | DIASTOLIC BLOOD PRESSURE: 80 MMHG

## 2017-10-08 DIAGNOSIS — I10 ESSENTIAL HYPERTENSION: Primary | ICD-10-CM

## 2017-10-08 DIAGNOSIS — K08.89 PAIN, DENTAL: ICD-10-CM

## 2017-10-08 PROCEDURE — 80053 COMPREHEN METABOLIC PANEL: CPT | Performed by: EMERGENCY MEDICINE

## 2017-10-08 PROCEDURE — 96374 THER/PROPH/DIAG INJ IV PUSH: CPT

## 2017-10-08 PROCEDURE — 85730 THROMBOPLASTIN TIME PARTIAL: CPT | Performed by: EMERGENCY MEDICINE

## 2017-10-08 PROCEDURE — 99284 EMERGENCY DEPT VISIT MOD MDM: CPT

## 2017-10-08 PROCEDURE — 81001 URINALYSIS AUTO W/SCOPE: CPT | Performed by: EMERGENCY MEDICINE

## 2017-10-08 PROCEDURE — 85025 COMPLETE CBC W/AUTO DIFF WBC: CPT | Performed by: EMERGENCY MEDICINE

## 2017-10-08 PROCEDURE — 96375 TX/PRO/DX INJ NEW DRUG ADDON: CPT

## 2017-10-08 PROCEDURE — 85610 PROTHROMBIN TIME: CPT | Performed by: EMERGENCY MEDICINE

## 2017-10-08 PROCEDURE — 70450 CT HEAD/BRAIN W/O DYE: CPT | Performed by: EMERGENCY MEDICINE

## 2017-10-08 RX ORDER — LABETALOL HYDROCHLORIDE 5 MG/ML
10 INJECTION, SOLUTION INTRAVENOUS ONCE
Status: COMPLETED | OUTPATIENT
Start: 2017-10-08 | End: 2017-10-08

## 2017-10-08 RX ORDER — HYDROMORPHONE HYDROCHLORIDE 1 MG/ML
0.5 INJECTION, SOLUTION INTRAMUSCULAR; INTRAVENOUS; SUBCUTANEOUS ONCE
Status: COMPLETED | OUTPATIENT
Start: 2017-10-08 | End: 2017-10-08

## 2017-10-08 RX ORDER — HYDROCODONE BITARTRATE AND ACETAMINOPHEN 5; 325 MG/1; MG/1
1-2 TABLET ORAL EVERY 4 HOURS PRN
Qty: 20 TABLET | Refills: 0 | Status: SHIPPED | OUTPATIENT
Start: 2017-10-08 | End: 2017-10-15

## 2017-10-08 NOTE — ED NOTES
DC instructions and RX handed to pt. Family at bedside. No distress noted. Pt requests WC out of ED. Pt thanks staff for care.

## 2017-10-08 NOTE — ED INITIAL ASSESSMENT (HPI)
Pt here stating he has a tooth ache that started yesterday and he has been in a lot of pain . Took his blood pressure this morning at home 245/116. C/o h/a denies blurred vision. Took tylenol at home at 0900 today.

## 2017-10-08 NOTE — ED PROVIDER NOTES
Patient Seen in: [facility] Emergency Department    History   Patient presents with:  Hypertension (cardiovascular)    Stated Complaint: htn    HPI    56-year-old male presents for evaluation of elevated blood pressure.   Patient took his blood press Disease Paternal Grandfather    • Heart Disease Brother    • Heart Disease Self    • Cancer Son        Smoking status: Never Smoker                                                              Smokeless tobacco: Never Used                      Alcohol use: Lymphocyte Absolute 0.86 (*)     All other components within normal limits   PTT, ACTIVATED - Normal    Narrative: The aPTT Heparin Therapeutic Range is approximately 65- 104 seconds.  The therapeutic range has been validated against 0.3-0.7 heparin 5-325 MG Oral Tab  Take 1-2 tablets by mouth every 4 (four) hours as needed for Pain.   Qty: 20 tablet Refills: 0

## 2017-10-08 NOTE — ED NOTES
Pt stating he has right sided facial and head pain near the tooth area radiating up his face and head.

## 2017-10-08 NOTE — ED NOTES
Pt has mx black teeth upper and lower.  Encouraged pt to see a dentist. Will update  pt is unable to afford and does not have dental insurance

## 2017-11-16 ENCOUNTER — PRIOR ORIGINAL RECORDS (OUTPATIENT)
Dept: OTHER | Age: 62
End: 2017-11-16

## 2017-11-16 LAB
ALKALINE PHOSPHATATE(ALK PHOS): 80 IU/L
BILIRUBIN TOTAL: 0.4 MG/DL
BUN: 18 MG/DL
CALCIUM: 9.4 MG/DL
CHLORIDE: 102 MEQ/L
CHOLESTEROL, TOTAL: 139 MG/DL
CREATININE, SERUM: 1.08 MG/DL
GLUCOSE: 142 MG/DL
HDL CHOLESTEROL: 38 MG/DL
HEMATOCRIT: 38 %
HEMOGLOBIN: 12.4 G/DL
LDL CHOLESTEROL: 35 MG/DL
PLATELETS: 280 K/UL
POTASSIUM, SERUM: 4 MEQ/L
PROTEIN, TOTAL: 8 G/DL
RED BLOOD COUNT: 4.37 X 10-6/U
SGOT (AST): 31 IU/L
SGPT (ALT): 37 IU/L
SODIUM: 133 MEQ/L
TRIGLYCERIDES: 330 MG/DL
WHITE BLOOD COUNT: 6.5 X 10-3/U

## 2017-12-11 ENCOUNTER — PRIOR ORIGINAL RECORDS (OUTPATIENT)
Dept: OTHER | Age: 62
End: 2017-12-11

## 2017-12-11 ENCOUNTER — LAB ENCOUNTER (OUTPATIENT)
Dept: LAB | Age: 62
End: 2017-12-11
Attending: INTERNAL MEDICINE
Payer: MEDICARE

## 2017-12-11 DIAGNOSIS — I25.10 CORONARY ATHEROSCLEROSIS OF NATIVE CORONARY ARTERY: Primary | ICD-10-CM

## 2017-12-11 DIAGNOSIS — I25.5 ISCHEMIC CARDIOMYOPATHY: ICD-10-CM

## 2017-12-11 DIAGNOSIS — I10 ESSENTIAL HYPERTENSION, MALIGNANT: ICD-10-CM

## 2017-12-11 PROCEDURE — 36415 COLL VENOUS BLD VENIPUNCTURE: CPT

## 2017-12-11 PROCEDURE — 84443 ASSAY THYROID STIM HORMONE: CPT

## 2017-12-11 PROCEDURE — 84450 TRANSFERASE (AST) (SGOT): CPT

## 2017-12-11 PROCEDURE — 84439 ASSAY OF FREE THYROXINE: CPT

## 2017-12-11 PROCEDURE — 84460 ALANINE AMINO (ALT) (SGPT): CPT

## 2017-12-12 ENCOUNTER — PRIOR ORIGINAL RECORDS (OUTPATIENT)
Dept: OTHER | Age: 62
End: 2017-12-12

## 2017-12-12 LAB
ALT (SGPT): 37 U/L
AST (SGOT): 22 U/L
FREE T4: 1.5 MG/DL
THYROID STIMULATING HORMONE: 1.8 MLU/L

## 2018-01-19 ENCOUNTER — MYAURORA ACCOUNT LINK (OUTPATIENT)
Dept: OTHER | Age: 63
End: 2018-01-19

## 2018-03-01 ENCOUNTER — PRIOR ORIGINAL RECORDS (OUTPATIENT)
Dept: OTHER | Age: 63
End: 2018-03-01

## 2018-03-01 ENCOUNTER — MYAURORA ACCOUNT LINK (OUTPATIENT)
Dept: OTHER | Age: 63
End: 2018-03-01

## 2018-05-30 ENCOUNTER — PRIOR ORIGINAL RECORDS (OUTPATIENT)
Dept: OTHER | Age: 63
End: 2018-05-30

## 2018-05-31 ENCOUNTER — PRIOR ORIGINAL RECORDS (OUTPATIENT)
Dept: OTHER | Age: 63
End: 2018-05-31

## 2018-06-20 ENCOUNTER — PRIOR ORIGINAL RECORDS (OUTPATIENT)
Dept: OTHER | Age: 63
End: 2018-06-20

## 2018-08-20 ENCOUNTER — PRIOR ORIGINAL RECORDS (OUTPATIENT)
Dept: OTHER | Age: 63
End: 2018-08-20

## 2018-08-23 ENCOUNTER — PRIOR ORIGINAL RECORDS (OUTPATIENT)
Dept: OTHER | Age: 63
End: 2018-08-23

## 2018-09-20 ENCOUNTER — MYAURORA ACCOUNT LINK (OUTPATIENT)
Dept: OTHER | Age: 63
End: 2018-09-20

## 2018-09-20 ENCOUNTER — HOSPITAL ENCOUNTER (OUTPATIENT)
Dept: CV DIAGNOSTICS | Facility: HOSPITAL | Age: 63
Discharge: HOME | End: 2018-09-20
Attending: INTERNAL MEDICINE

## 2018-09-20 DIAGNOSIS — I25.5 CARDIOMYOPATHY, ISCHEMIC: ICD-10-CM

## 2018-09-20 DIAGNOSIS — I10 BENIGN HYPERTENSION: ICD-10-CM

## 2018-09-21 ENCOUNTER — PRIOR ORIGINAL RECORDS (OUTPATIENT)
Dept: OTHER | Age: 63
End: 2018-09-21

## 2018-09-24 ENCOUNTER — HOSPITAL ENCOUNTER (OUTPATIENT)
Dept: GENERAL RADIOLOGY | Age: 63
Discharge: HOME | End: 2018-09-24
Attending: INTERNAL MEDICINE
Payer: MEDICARE

## 2018-09-24 ENCOUNTER — LAB ENCOUNTER (OUTPATIENT)
Dept: LAB | Age: 63
End: 2018-09-24
Attending: INTERNAL MEDICINE
Payer: MEDICARE

## 2018-09-24 ENCOUNTER — APPOINTMENT (OUTPATIENT)
Dept: LAB | Age: 63
End: 2018-09-24
Attending: INTERNAL MEDICINE
Payer: MEDICARE

## 2018-09-24 ENCOUNTER — PRIOR ORIGINAL RECORDS (OUTPATIENT)
Dept: OTHER | Age: 63
End: 2018-09-24

## 2018-09-24 DIAGNOSIS — I47.2 V-TACH (HCC): ICD-10-CM

## 2018-09-24 DIAGNOSIS — I47.2 V TACH (HCC): ICD-10-CM

## 2018-09-24 DIAGNOSIS — I25.10 CAD (CORONARY ARTERY DISEASE): Primary | ICD-10-CM

## 2018-09-24 DIAGNOSIS — I25.10 CAD (CORONARY ARTERY DISEASE): ICD-10-CM

## 2018-09-24 LAB
ANION GAP SERPL CALC-SCNC: 8 MMOL/L (ref 0–18)
BASOPHILS # BLD AUTO: 0.02 X10(3) UL (ref 0–0.1)
BASOPHILS NFR BLD AUTO: 0.4 %
BUN BLD-MCNC: 14 MG/DL (ref 8–20)
BUN/CREAT SERPL: 12 (ref 10–20)
CALCIUM BLD-MCNC: 9 MG/DL (ref 8.3–10.3)
CHLORIDE SERPL-SCNC: 105 MMOL/L (ref 101–111)
CO2 SERPL-SCNC: 28 MMOL/L (ref 22–32)
CREAT BLD-MCNC: 1.17 MG/DL (ref 0.7–1.3)
EOSINOPHIL # BLD AUTO: 0.06 X10(3) UL (ref 0–0.3)
EOSINOPHIL NFR BLD AUTO: 1.3 %
ERYTHROCYTE [DISTWIDTH] IN BLOOD BY AUTOMATED COUNT: 14.4 % (ref 11.5–16)
GLUCOSE BLD-MCNC: 131 MG/DL (ref 70–99)
HCT VFR BLD AUTO: 37.3 % (ref 37–53)
HGB BLD-MCNC: 11.7 G/DL (ref 13–17)
IMMATURE GRANULOCYTE COUNT: 0.01 X10(3) UL (ref 0–1)
IMMATURE GRANULOCYTE RATIO %: 0.2 %
LYMPHOCYTES # BLD AUTO: 1.2 X10(3) UL (ref 0.9–4)
LYMPHOCYTES NFR BLD AUTO: 26.7 %
MCH RBC QN AUTO: 28.4 PG (ref 27–33.2)
MCHC RBC AUTO-ENTMCNC: 31.4 G/DL (ref 31–37)
MCV RBC AUTO: 90.5 FL (ref 80–99)
MONOCYTES # BLD AUTO: 0.32 X10(3) UL (ref 0.1–1)
MONOCYTES NFR BLD AUTO: 7.1 %
NEUTROPHIL ABS PRELIM: 2.89 X10 (3) UL (ref 1.3–6.7)
NEUTROPHILS # BLD AUTO: 2.89 X10(3) UL (ref 1.3–6.7)
NEUTROPHILS NFR BLD AUTO: 64.3 %
OSMOLALITY SERPL CALC.SUM OF ELEC: 294 MOSM/KG (ref 275–295)
PLATELET # BLD AUTO: 286 10(3)UL (ref 150–450)
POTASSIUM SERPL-SCNC: 3.4 MMOL/L (ref 3.6–5.1)
RBC # BLD AUTO: 4.12 X10(6)UL (ref 3.8–5.8)
RED CELL DISTRIBUTION WIDTH-SD: 47.8 FL (ref 35.1–46.3)
SODIUM SERPL-SCNC: 141 MMOL/L (ref 136–144)
WBC # BLD AUTO: 4.5 X10(3) UL (ref 4–13)

## 2018-09-24 PROCEDURE — 80048 BASIC METABOLIC PNL TOTAL CA: CPT

## 2018-09-24 PROCEDURE — 85025 COMPLETE CBC W/AUTO DIFF WBC: CPT

## 2018-09-24 PROCEDURE — 36415 COLL VENOUS BLD VENIPUNCTURE: CPT

## 2018-09-24 PROCEDURE — 71046 X-RAY EXAM CHEST 2 VIEWS: CPT | Performed by: INTERNAL MEDICINE

## 2018-09-25 RX ORDER — FUROSEMIDE 20 MG/1
20 TABLET ORAL EVERY EVENING
Status: ON HOLD | COMMUNITY
End: 2019-11-06

## 2018-09-25 RX ORDER — FOLIC ACID/MULTIVIT,IRON,MINER 0.4MG-18MG
1 TABLET ORAL 2 TIMES DAILY
COMMUNITY

## 2018-09-25 NOTE — HISTORICAL OFFICE NOTE
Marisa Fabian  : 1955  ACCOUNT:  [id number]  [phone]  PCP: Dr. Ng Early     TODAY'S DATE: 2018  DICTATED BY:  [Dr. Martha Layne: [Followup of Hypertension, benign and V. tach.]    HPI:    [On 2018Cheikh Bobo CAD, patent seq,saph. vein graft to the 1st OM,main circ and dist. RCA with patent LIMA to LAD, MRI compatible as of 3/1/18, NQWMI, patient wearing life vest and PTCA/Stent 2016    FAMILY HISTORY: Negative for premature CAD. Negative for AAA.     SOCIAL HIS Lipoprotein Deficiencies      PLAN:  [  Continue current cardiac medications. The case was reviewed with Dr. Jess Miguel. Given recurrent ventricular arrhythmias, we are recommending cardiac catheterization to rule out recurrent coronary artery disease.   He

## 2018-09-27 ENCOUNTER — HOSPITAL ENCOUNTER (OUTPATIENT)
Dept: INTERVENTIONAL RADIOLOGY/VASCULAR | Facility: HOSPITAL | Age: 63
Discharge: HOME | End: 2018-09-27
Attending: INTERNAL MEDICINE | Admitting: INTERNAL MEDICINE
Payer: MEDICARE

## 2018-09-27 VITALS
SYSTOLIC BLOOD PRESSURE: 110 MMHG | DIASTOLIC BLOOD PRESSURE: 77 MMHG | TEMPERATURE: 98 F | BODY MASS INDEX: 44.43 KG/M2 | WEIGHT: 300 LBS | HEART RATE: 57 BPM | OXYGEN SATURATION: 92 % | HEIGHT: 69 IN | RESPIRATION RATE: 14 BRPM

## 2018-09-27 DIAGNOSIS — I25.10 CAD (CORONARY ARTERY DISEASE): ICD-10-CM

## 2018-09-27 DIAGNOSIS — I25.5 ISCHEMIC CARDIOMYOPATHY: ICD-10-CM

## 2018-09-27 DIAGNOSIS — I47.2 VT (VENTRICULAR TACHYCARDIA) (HCC): ICD-10-CM

## 2018-09-27 PROCEDURE — B2131ZZ FLUOROSCOPY OF MULTIPLE CORONARY ARTERY BYPASS GRAFTS USING LOW OSMOLAR CONTRAST: ICD-10-PCS | Performed by: INTERNAL MEDICINE

## 2018-09-27 PROCEDURE — 99152 MOD SED SAME PHYS/QHP 5/>YRS: CPT

## 2018-09-27 PROCEDURE — 82962 GLUCOSE BLOOD TEST: CPT

## 2018-09-27 PROCEDURE — B2111ZZ FLUOROSCOPY OF MULTIPLE CORONARY ARTERIES USING LOW OSMOLAR CONTRAST: ICD-10-PCS | Performed by: INTERNAL MEDICINE

## 2018-09-27 PROCEDURE — B2181ZZ FLUOROSCOPY OF LEFT INTERNAL MAMMARY BYPASS GRAFT USING LOW OSMOLAR CONTRAST: ICD-10-PCS | Performed by: INTERNAL MEDICINE

## 2018-09-27 PROCEDURE — 4A023N7 MEASUREMENT OF CARDIAC SAMPLING AND PRESSURE, LEFT HEART, PERCUTANEOUS APPROACH: ICD-10-PCS | Performed by: INTERNAL MEDICINE

## 2018-09-27 PROCEDURE — 93459 L HRT ART/GRFT ANGIO: CPT

## 2018-09-27 RX ORDER — ASPIRIN 81 MG/1
324 TABLET, CHEWABLE ORAL ONCE
Status: DISCONTINUED | OUTPATIENT
Start: 2018-09-27 | End: 2018-09-27 | Stop reason: HOSPADM

## 2018-09-27 RX ORDER — HEPARIN SODIUM 5000 [USP'U]/ML
INJECTION, SOLUTION INTRAVENOUS; SUBCUTANEOUS
Status: COMPLETED
Start: 2018-09-27 | End: 2018-09-27

## 2018-09-27 RX ORDER — SODIUM CHLORIDE 9 MG/ML
INJECTION, SOLUTION INTRAVENOUS CONTINUOUS
Status: ACTIVE | OUTPATIENT
Start: 2018-09-27 | End: 2018-09-27

## 2018-09-27 RX ORDER — MIDAZOLAM HYDROCHLORIDE 1 MG/ML
INJECTION INTRAMUSCULAR; INTRAVENOUS
Status: COMPLETED
Start: 2018-09-27 | End: 2018-09-27

## 2018-09-27 RX ORDER — SODIUM CHLORIDE 9 MG/ML
INJECTION, SOLUTION INTRAVENOUS CONTINUOUS
Status: DISCONTINUED | OUTPATIENT
Start: 2018-09-27 | End: 2018-10-05

## 2018-09-27 RX ORDER — LIDOCAINE HYDROCHLORIDE 10 MG/ML
INJECTION, SOLUTION EPIDURAL; INFILTRATION; INTRACAUDAL; PERINEURAL
Status: COMPLETED
Start: 2018-09-27 | End: 2018-09-27

## 2018-09-27 RX ADMIN — SODIUM CHLORIDE: 9 INJECTION, SOLUTION INTRAVENOUS at 08:15:00

## 2018-09-27 RX ADMIN — SODIUM CHLORIDE: 9 INJECTION, SOLUTION INTRAVENOUS at 10:45:00

## 2018-09-27 NOTE — PROCEDURES
[facility]    PATIENT'S NAME: Apple Thibodeaux   ATTENDING PHYSICIAN: Alejandra Wylie M.D.    OPERATING PHYSICIAN: Greg Parham MD   PATIENT ACCOUNT#:   [id number]    LOCATION:  [address] [place] [address] Dr JONES [facility] EDW  MEDICAL RECORD #:   [id number]       DATE [facility] PDA.  This was widely patent. Previously placed stent was widely patent. There was no significant disease distal to the anastomoses. The JR4 catheter was then used to engage the LIMA.   Angiography of the LIMA showed a widely patent LIMA with no signific

## 2018-09-27 NOTE — H&P
This is a 15-year-old gentleman with a history of an ischemic cardia myopathy, prior history of ventricular tachycardia presently on amiodarone therapy, carvedilol 25 mg twice daily, single-chamber ICD.       Several days ago, he received antitachycardia p occasional. ALCOHOL: denies drinking. EXERCISE: no regular exercise. DIET: no special diet. MARITAL STATUS: .  OCCUPATION: retired.      ALLERGIES: Penicillins - CLASS     MEDICATIONS: Selected prescriptions see below     VITAL SIGNS: [B/P - 150/80 ,

## 2018-09-27 NOTE — PROGRESS NOTES
Pt post LHC via right groin. Perclose used to close site. Groin c/d/i & soft on arrival back to unit. All peripheral pulses intact & palp. After required bedrest & voiding, pt ambulated in hallway w/o difficulty. Groin remains intact.  Discharge instruction

## 2018-10-04 LAB
BUN: 14 MG/DL
CALCIUM: 9 MG/DL
CHLORIDE: 105 MEQ/L
CREATININE, SERUM: 1.17 MG/DL
GLUCOSE: 131 MG/DL
HEMATOCRIT: 37.3 %
HEMOGLOBIN: 11.7 G/DL
PLATELETS: 286 K/UL
POTASSIUM, SERUM: 3.4 MEQ/L
RED BLOOD COUNT: 4.12 X 10-6/U
SODIUM: 141 MEQ/L
WHITE BLOOD COUNT: 4.5 X 10-3/U

## 2018-10-12 ENCOUNTER — PRIOR ORIGINAL RECORDS (OUTPATIENT)
Dept: OTHER | Age: 63
End: 2018-10-12

## 2018-10-15 ENCOUNTER — PRIOR ORIGINAL RECORDS (OUTPATIENT)
Dept: OTHER | Age: 63
End: 2018-10-15

## 2018-10-17 NOTE — HISTORICAL OFFICE NOTE
Deloris Crockett  : 1955  ACCOUNT:  [id number]  [phone]  PCP: Dr. Alma Long     TODAY'S DATE: 2018  DICTATED BY:  [Dr. Estevez Area: [Followup of Hypertension, benign and V. tach.]    HPI:    [On 2018Corinne Salina CAD, patent seq,saph. vein graft to the 1st OM,main circ and dist. RCA with patent LIMA to LAD, MRI compatible as of 3/1/18, NQWMI, patient wearing life vest and PTCA/Stent 2016    FAMILY HISTORY: Negative for premature CAD. Negative for AAA.     SOCIAL HIS Lipoprotein Deficiencies      PLAN:  [  Continue current cardiac medications. The case was reviewed with Dr. Sen Martino. Given recurrent ventricular arrhythmias, we are recommending cardiac catheterization to rule out recurrent coronary artery disease.   He

## 2018-10-19 ENCOUNTER — ANESTHESIA (OUTPATIENT)
Dept: INTERVENTIONAL RADIOLOGY/VASCULAR | Facility: HOSPITAL | Age: 63
End: 2018-10-19

## 2018-10-19 ENCOUNTER — ANESTHESIA EVENT (OUTPATIENT)
Dept: INTERVENTIONAL RADIOLOGY/VASCULAR | Facility: HOSPITAL | Age: 63
End: 2018-10-19

## 2018-10-19 ENCOUNTER — HOSPITAL ENCOUNTER (OUTPATIENT)
Dept: INTERVENTIONAL RADIOLOGY/VASCULAR | Facility: HOSPITAL | Age: 63
Discharge: HOME | End: 2018-10-20
Attending: INTERNAL MEDICINE | Admitting: INTERNAL MEDICINE
Payer: MEDICARE

## 2018-10-19 DIAGNOSIS — I25.10 CAD (CORONARY ARTERY DISEASE): ICD-10-CM

## 2018-10-19 DIAGNOSIS — I42.9 CARDIOMYOPATHY (HCC): ICD-10-CM

## 2018-10-19 PROBLEM — I47.20 VT (VENTRICULAR TACHYCARDIA) (HCC): Status: ACTIVE | Noted: 2018-10-19

## 2018-10-19 PROBLEM — I47.2 VT (VENTRICULAR TACHYCARDIA) (HCC): Status: ACTIVE | Noted: 2018-10-19

## 2018-10-19 PROCEDURE — 80048 BASIC METABOLIC PNL TOTAL CA: CPT | Performed by: INTERNAL MEDICINE

## 2018-10-19 PROCEDURE — 82962 GLUCOSE BLOOD TEST: CPT

## 2018-10-19 PROCEDURE — 02K83ZZ MAP CONDUCTION MECHANISM, PERCUTANEOUS APPROACH: ICD-10-PCS | Performed by: INTERNAL MEDICINE

## 2018-10-19 PROCEDURE — 4A023FZ MEASUREMENT OF CARDIAC RHYTHM, PERCUTANEOUS APPROACH: ICD-10-PCS | Performed by: INTERNAL MEDICINE

## 2018-10-19 PROCEDURE — 94660 CPAP INITIATION&MGMT: CPT

## 2018-10-19 PROCEDURE — 85347 COAGULATION TIME ACTIVATED: CPT

## 2018-10-19 PROCEDURE — 85610 PROTHROMBIN TIME: CPT | Performed by: INTERNAL MEDICINE

## 2018-10-19 PROCEDURE — 93654 COMPRE EP EVAL TX VT: CPT

## 2018-10-19 PROCEDURE — 93287 PERI-PX DEVICE EVAL & PRGR: CPT

## 2018-10-19 PROCEDURE — 93005 ELECTROCARDIOGRAM TRACING: CPT

## 2018-10-19 PROCEDURE — 76937 US GUIDE VASCULAR ACCESS: CPT

## 2018-10-19 PROCEDURE — 4A0234Z MEASUREMENT OF CARDIAC ELECTRICAL ACTIVITY, PERCUTANEOUS APPROACH: ICD-10-PCS | Performed by: INTERNAL MEDICINE

## 2018-10-19 PROCEDURE — 93010 ELECTROCARDIOGRAM REPORT: CPT | Performed by: INTERNAL MEDICINE

## 2018-10-19 PROCEDURE — 02583ZZ DESTRUCTION OF CONDUCTION MECHANISM, PERCUTANEOUS APPROACH: ICD-10-PCS | Performed by: INTERNAL MEDICINE

## 2018-10-19 PROCEDURE — B24CZZZ ULTRASONOGRAPHY OF PERICARDIUM: ICD-10-PCS | Performed by: INTERNAL MEDICINE

## 2018-10-19 PROCEDURE — 93662 INTRACARDIAC ECG (ICE): CPT

## 2018-10-19 RX ORDER — FUROSEMIDE 10 MG/ML
INJECTION INTRAMUSCULAR; INTRAVENOUS
Status: COMPLETED
Start: 2018-10-19 | End: 2018-10-19

## 2018-10-19 RX ORDER — MORPHINE SULFATE 4 MG/ML
INJECTION, SOLUTION INTRAMUSCULAR; INTRAVENOUS
Status: COMPLETED
Start: 2018-10-19 | End: 2018-10-19

## 2018-10-19 RX ORDER — NALOXONE HYDROCHLORIDE 0.4 MG/ML
80 INJECTION, SOLUTION INTRAMUSCULAR; INTRAVENOUS; SUBCUTANEOUS AS NEEDED
Status: DISCONTINUED | OUTPATIENT
Start: 2018-10-19 | End: 2018-10-19

## 2018-10-19 RX ORDER — HEPARIN SODIUM 1000 [USP'U]/ML
INJECTION, SOLUTION INTRAVENOUS; SUBCUTANEOUS
Status: COMPLETED
Start: 2018-10-19 | End: 2018-10-19

## 2018-10-19 RX ORDER — MIDAZOLAM HYDROCHLORIDE 1 MG/ML
1 INJECTION INTRAMUSCULAR; INTRAVENOUS ONCE
Status: COMPLETED | OUTPATIENT
Start: 2018-10-19 | End: 2018-10-19

## 2018-10-19 RX ORDER — HYDRALAZINE HYDROCHLORIDE 20 MG/ML
5 INJECTION INTRAMUSCULAR; INTRAVENOUS AS NEEDED
Status: DISCONTINUED | OUTPATIENT
Start: 2018-10-19 | End: 2018-10-19 | Stop reason: HOSPADM

## 2018-10-19 RX ORDER — FUROSEMIDE 10 MG/ML
20 INJECTION INTRAMUSCULAR; INTRAVENOUS ONCE
Status: COMPLETED | OUTPATIENT
Start: 2018-10-19 | End: 2018-10-19

## 2018-10-19 RX ORDER — CEFAZOLIN SODIUM/WATER 2 G/20 ML
SYRINGE (ML) INTRAVENOUS
Status: COMPLETED
Start: 2018-10-19 | End: 2018-10-19

## 2018-10-19 RX ORDER — ACETAMINOPHEN 10 MG/ML
INJECTION, SOLUTION INTRAVENOUS
Status: COMPLETED
Start: 2018-10-19 | End: 2018-10-19

## 2018-10-19 RX ORDER — FUROSEMIDE 20 MG/1
20 TABLET ORAL EVERY EVENING
Status: DISCONTINUED | OUTPATIENT
Start: 2018-10-19 | End: 2018-10-20

## 2018-10-19 RX ORDER — ONDANSETRON 2 MG/ML
4 INJECTION INTRAMUSCULAR; INTRAVENOUS AS NEEDED
Status: DISCONTINUED | OUTPATIENT
Start: 2018-10-19 | End: 2018-10-19 | Stop reason: HOSPADM

## 2018-10-19 RX ORDER — PROTAMINE SULFATE 10 MG/ML
INJECTION, SOLUTION INTRAVENOUS
Status: COMPLETED
Start: 2018-10-19 | End: 2018-10-19

## 2018-10-19 RX ORDER — POTASSIUM CHLORIDE 20 MEQ/1
20 TABLET, EXTENDED RELEASE ORAL DAILY
Status: DISCONTINUED | OUTPATIENT
Start: 2018-10-19 | End: 2018-10-20

## 2018-10-19 RX ORDER — TORSEMIDE 20 MG/1
40 TABLET ORAL DAILY
Status: DISCONTINUED | OUTPATIENT
Start: 2018-10-19 | End: 2018-10-20

## 2018-10-19 RX ORDER — FUROSEMIDE 10 MG/ML
20 INJECTION INTRAMUSCULAR; INTRAVENOUS ONCE
Status: DISCONTINUED | OUTPATIENT
Start: 2018-10-19 | End: 2018-10-19 | Stop reason: HOSPADM

## 2018-10-19 RX ORDER — DEXTROSE MONOHYDRATE 25 G/50ML
50 INJECTION, SOLUTION INTRAVENOUS
Status: DISCONTINUED | OUTPATIENT
Start: 2018-10-19 | End: 2018-10-19 | Stop reason: HOSPADM

## 2018-10-19 RX ORDER — CARVEDILOL 12.5 MG/1
25 TABLET ORAL 2 TIMES DAILY WITH MEALS
Status: DISCONTINUED | OUTPATIENT
Start: 2018-10-19 | End: 2018-10-20

## 2018-10-19 RX ORDER — MIDAZOLAM HYDROCHLORIDE 1 MG/ML
1 INJECTION INTRAMUSCULAR; INTRAVENOUS EVERY 5 MIN PRN
Status: DISCONTINUED | OUTPATIENT
Start: 2018-10-19 | End: 2018-10-19 | Stop reason: HOSPADM

## 2018-10-19 RX ORDER — ACETAMINOPHEN 10 MG/ML
1000 INJECTION, SOLUTION INTRAVENOUS ONCE
Status: COMPLETED | OUTPATIENT
Start: 2018-10-19 | End: 2018-10-19

## 2018-10-19 RX ORDER — CLOPIDOGREL BISULFATE 75 MG/1
75 TABLET ORAL DAILY
Status: DISCONTINUED | OUTPATIENT
Start: 2018-10-19 | End: 2018-10-20

## 2018-10-19 RX ORDER — LOSARTAN POTASSIUM 100 MG/1
100 TABLET ORAL DAILY
Status: DISCONTINUED | OUTPATIENT
Start: 2018-10-19 | End: 2018-10-20

## 2018-10-19 RX ORDER — SODIUM CHLORIDE 9 MG/ML
INJECTION, SOLUTION INTRAVENOUS CONTINUOUS
Status: DISCONTINUED | OUTPATIENT
Start: 2018-10-19 | End: 2018-10-19

## 2018-10-19 RX ORDER — MORPHINE SULFATE 4 MG/ML
2 INJECTION, SOLUTION INTRAMUSCULAR; INTRAVENOUS AS NEEDED
Status: DISCONTINUED | OUTPATIENT
Start: 2018-10-19 | End: 2018-10-19 | Stop reason: HOSPADM

## 2018-10-19 RX ORDER — POTASSIUM CHLORIDE 20 MEQ/1
40 TABLET, EXTENDED RELEASE ORAL EVERY 4 HOURS
Status: COMPLETED | OUTPATIENT
Start: 2018-10-19 | End: 2018-10-20

## 2018-10-19 RX ORDER — MONTELUKAST SODIUM 10 MG/1
10 TABLET ORAL NIGHTLY
Status: DISCONTINUED | OUTPATIENT
Start: 2018-10-19 | End: 2018-10-20

## 2018-10-19 RX ORDER — SODIUM CHLORIDE, SODIUM LACTATE, POTASSIUM CHLORIDE, CALCIUM CHLORIDE 600; 310; 30; 20 MG/100ML; MG/100ML; MG/100ML; MG/100ML
INJECTION, SOLUTION INTRAVENOUS CONTINUOUS
Status: DISCONTINUED | OUTPATIENT
Start: 2018-10-19 | End: 2018-10-19

## 2018-10-19 RX ORDER — MIDAZOLAM HYDROCHLORIDE 1 MG/ML
0.5 INJECTION INTRAMUSCULAR; INTRAVENOUS EVERY 5 MIN PRN
Status: DISCONTINUED | OUTPATIENT
Start: 2018-10-19 | End: 2018-10-19 | Stop reason: HOSPADM

## 2018-10-19 RX ORDER — NALOXONE HYDROCHLORIDE 0.4 MG/ML
80 INJECTION, SOLUTION INTRAMUSCULAR; INTRAVENOUS; SUBCUTANEOUS AS NEEDED
Status: DISCONTINUED | OUTPATIENT
Start: 2018-10-19 | End: 2018-10-19 | Stop reason: HOSPADM

## 2018-10-19 RX ORDER — ACETAMINOPHEN 325 MG/1
650 TABLET ORAL EVERY 6 HOURS PRN
Status: DISCONTINUED | OUTPATIENT
Start: 2018-10-19 | End: 2018-10-20

## 2018-10-19 RX ORDER — ASPIRIN 325 MG
325 TABLET, DELAYED RELEASE (ENTERIC COATED) ORAL DAILY
Status: DISCONTINUED | OUTPATIENT
Start: 2018-10-19 | End: 2018-10-19

## 2018-10-19 RX ORDER — MIDAZOLAM HYDROCHLORIDE 1 MG/ML
INJECTION INTRAMUSCULAR; INTRAVENOUS
Status: COMPLETED
Start: 2018-10-19 | End: 2018-10-19

## 2018-10-19 RX ORDER — WARFARIN SODIUM 2 MG/1
4 TABLET ORAL NIGHTLY
Status: DISCONTINUED | OUTPATIENT
Start: 2018-10-19 | End: 2018-10-20

## 2018-10-19 RX ORDER — HYDRALAZINE HYDROCHLORIDE 20 MG/ML
INJECTION INTRAMUSCULAR; INTRAVENOUS
Status: COMPLETED
Start: 2018-10-19 | End: 2018-10-19

## 2018-10-19 RX ORDER — TEMAZEPAM 15 MG/1
15 CAPSULE ORAL NIGHTLY PRN
Status: DISCONTINUED | OUTPATIENT
Start: 2018-10-19 | End: 2018-10-20

## 2018-10-19 RX ORDER — AMIODARONE HYDROCHLORIDE 200 MG/1
200 TABLET ORAL DAILY
Status: DISCONTINUED | OUTPATIENT
Start: 2018-10-19 | End: 2018-10-20

## 2018-10-19 RX ORDER — HEPARIN SODIUM 5000 [USP'U]/ML
INJECTION, SOLUTION INTRAVENOUS; SUBCUTANEOUS
Status: COMPLETED
Start: 2018-10-19 | End: 2018-10-19

## 2018-10-19 RX ADMIN — MIDAZOLAM HYDROCHLORIDE 0.5 MG: 1 INJECTION INTRAMUSCULAR; INTRAVENOUS at 15:16:00

## 2018-10-19 RX ADMIN — ACETAMINOPHEN 650 MG: 325 TABLET ORAL at 17:28:00

## 2018-10-19 RX ADMIN — WARFARIN SODIUM 4 MG: 2 TABLET ORAL at 20:49:00

## 2018-10-19 RX ADMIN — LOSARTAN POTASSIUM 100 MG: 100 TABLET ORAL at 17:29:00

## 2018-10-19 RX ADMIN — MORPHINE SULFATE 2 MG: 4 INJECTION, SOLUTION INTRAMUSCULAR; INTRAVENOUS at 15:55:00

## 2018-10-19 RX ADMIN — CLOPIDOGREL BISULFATE 75 MG: 75 TABLET ORAL at 17:28:00

## 2018-10-19 RX ADMIN — HYDRALAZINE HYDROCHLORIDE 5 MG: 20 INJECTION INTRAMUSCULAR; INTRAVENOUS at 15:32:00

## 2018-10-19 RX ADMIN — MIDAZOLAM HYDROCHLORIDE 0.5 MG: 1 INJECTION INTRAMUSCULAR; INTRAVENOUS at 14:52:00

## 2018-10-19 RX ADMIN — HYDRALAZINE HYDROCHLORIDE 5 MG: 20 INJECTION INTRAMUSCULAR; INTRAVENOUS at 15:25:00

## 2018-10-19 RX ADMIN — CARVEDILOL 25 MG: 12.5 TABLET ORAL at 17:36:00

## 2018-10-19 RX ADMIN — SODIUM CHLORIDE, SODIUM LACTATE, POTASSIUM CHLORIDE, CALCIUM CHLORIDE: 600; 310; 30; 20 INJECTION, SOLUTION INTRAVENOUS at 14:15:00

## 2018-10-19 RX ADMIN — POTASSIUM CHLORIDE 40 MEQ: 20 TABLET, EXTENDED RELEASE ORAL at 17:36:00

## 2018-10-19 RX ADMIN — FUROSEMIDE 20 MG: 20 TABLET ORAL at 17:30:00

## 2018-10-19 RX ADMIN — POTASSIUM CHLORIDE 40 MEQ: 20 TABLET, EXTENDED RELEASE ORAL at 20:49:00

## 2018-10-19 RX ADMIN — AMIODARONE HYDROCHLORIDE 200 MG: 200 TABLET ORAL at 17:28:00

## 2018-10-19 RX ADMIN — ACETAMINOPHEN 1000 MG: 10 INJECTION, SOLUTION INTRAVENOUS at 15:38:00

## 2018-10-19 RX ADMIN — MORPHINE SULFATE 2 MG: 4 INJECTION, SOLUTION INTRAMUSCULAR; INTRAVENOUS at 15:50:00

## 2018-10-19 RX ADMIN — FUROSEMIDE 20 MG: 10 INJECTION INTRAMUSCULAR; INTRAVENOUS at 14:14:00

## 2018-10-19 RX ADMIN — POTASSIUM CHLORIDE 20 MEQ: 20 TABLET, EXTENDED RELEASE ORAL at 17:29:00

## 2018-10-19 RX ADMIN — MONTELUKAST SODIUM 10 MG: 10 TABLET ORAL at 20:49:00

## 2018-10-19 RX ADMIN — TORSEMIDE 40 MG: 20 TABLET ORAL at 17:29:00

## 2018-10-19 NOTE — ANESTHESIA POSTPROCEDURE EVALUATION
[address] Patient Status:  Outpatient in a Bed   Age/Gender 70year old male MRN [id number]   Location [facility] Attending Rich Barnett MD   Hosp Day # 0 PCP Noe Oquendo MD       Anesthesia Post-o

## 2018-10-19 NOTE — PROCEDURES
Procedures performed:  1. Comprehensive EP study  2. 3-D mapping  3. RFA of VT     : Kylie Amaro MD, Marivel Pearson MD     Indication: Symptomatic VT  Sedation: Provided by General Anesthesia service     Complication: none     Methods:  The patie

## 2018-10-19 NOTE — H&P
[facility]/AMJUAN C  H&P    Tabby Del Angel Patient Status:  Outpatient in a Bed    1955 MRN [id number]   Location [facility] [address] [facility] Attending Elkin Hall MD   Hosp Day # 0 PCP Ketan Ortiz MD blood transfusion    • Hyperlipidemia    • Osteoarthritis    • Other and unspecified hyperlipidemia    • Pulmonary embolism (HCC)    • Sleep apnea    • Unspecified essential hypertension      Past Surgical History:   Procedure Laterality Date   • BYPASS HUDSON negative except as described above in HPI. Physical Exam:  Blood pressure 144/72, pulse 57, temperature 97.6 °F (36.4 °C), temperature source Temporal, resp. rate 10, height 69\", weight 300 lb, SpO2 94 %.   Temp (24hrs), Av.6 °F (36.4 °C), Min:97.6

## 2018-10-19 NOTE — ANESTHESIA PREPROCEDURE EVALUATION
PRE-OP EVALUATION    Patient Name: Brando Hess    Pre-op Diagnosis: * No surgery found *    * No surgery found *    * Surgery not found *    Pre-op vitals reviewed. Body mass index is 44.3 kg/m². Current medications reviewed.   [facility] Med mg by mouth daily. Disp:  Rfl:        Allergies: Penicillins      Anesthesia Evaluation    Patient summary reviewed. Anesthetic Complications  (-) history of anesthetic complications         GI/Hepatic/Renal    Negative GI/hepatic/renal ROS. Pickwickian syndrome ([facility] [address]  Pulmonary embolism without acute cor pulmonale (HCC)                 (+) shortness of breath     (+) sleep apnea and CPAP      Neuro/Psych    Negative neuro/psych ROS.                                 Past Surgical History:   Proc general anesthetic include but not are not limited to adverse reactions related to medications administered, dental damage, and sore throat postoperatively. Questions were answered and consent was signed. Arterial line preinduction.   Plan/risks discus

## 2018-10-20 VITALS
SYSTOLIC BLOOD PRESSURE: 142 MMHG | HEART RATE: 89 BPM | DIASTOLIC BLOOD PRESSURE: 71 MMHG | WEIGHT: 300 LBS | OXYGEN SATURATION: 91 % | BODY MASS INDEX: 44.43 KG/M2 | HEIGHT: 69 IN | RESPIRATION RATE: 18 BRPM | TEMPERATURE: 99 F

## 2018-10-20 PROCEDURE — 84132 ASSAY OF SERUM POTASSIUM: CPT | Performed by: NURSE PRACTITIONER

## 2018-10-20 PROCEDURE — 82962 GLUCOSE BLOOD TEST: CPT

## 2018-10-20 PROCEDURE — 85610 PROTHROMBIN TIME: CPT | Performed by: INTERNAL MEDICINE

## 2018-10-20 RX ORDER — POTASSIUM CHLORIDE 20 MEQ/1
40 TABLET, EXTENDED RELEASE ORAL ONCE
Status: COMPLETED | OUTPATIENT
Start: 2018-10-20 | End: 2018-10-20

## 2018-10-20 RX ADMIN — POTASSIUM CHLORIDE 40 MEQ: 20 TABLET, EXTENDED RELEASE ORAL at 07:58:00

## 2018-10-20 RX ADMIN — POTASSIUM CHLORIDE 40 MEQ: 20 TABLET, EXTENDED RELEASE ORAL at 00:32:00

## 2018-10-20 RX ADMIN — CARVEDILOL 25 MG: 12.5 TABLET ORAL at 08:00:00

## 2018-10-20 RX ADMIN — ACETAMINOPHEN 650 MG: 325 TABLET ORAL at 07:59:00

## 2018-10-20 RX ADMIN — LOSARTAN POTASSIUM 100 MG: 100 TABLET ORAL at 07:58:00

## 2018-10-20 RX ADMIN — TORSEMIDE 40 MG: 20 TABLET ORAL at 07:59:00

## 2018-10-20 RX ADMIN — CLOPIDOGREL BISULFATE 75 MG: 75 TABLET ORAL at 08:00:00

## 2018-10-20 RX ADMIN — AMIODARONE HYDROCHLORIDE 200 MG: 200 TABLET ORAL at 08:00:00

## 2018-10-20 NOTE — PLAN OF CARE
Problem: CARDIOVASCULAR - ADULT  Goal: Maintains optimal cardiac output and hemodynamic stability  INTERVENTIONS:  - Monitor vital signs, rhythm, and trends  - Monitor for bleeding, hypotension and signs of decreased cardiac output  - Evaluate effectivenes prevention bundle as indicated  Outcome: Progressing

## 2018-10-20 NOTE — PROGRESS NOTES
· [facility] Card[facility] Progress Note     Subjective:  No new issues overnight.   Knee pain post procedure resolved with Tylenol - known OA    Objective:  142/71  99.2  SR 2 PVCs  No VT  I/O - 1255    K 3.8  INR 1.22    Cardiac: S1 S2 regular  Lungs:clear

## 2018-10-20 NOTE — RESPIRATORY THERAPY NOTE
FAISAL - Equipment Use Daily Summary:  · Set Mode CPAP  · Usage in hours: 8:54  · 90% Pressure (EPAP) level: 9.0  · 90% Insp Pressure (IPAP):   · AHI: 2.4  · Supplemental Oxygen:   · Comments:

## 2018-10-24 ENCOUNTER — PRIOR ORIGINAL RECORDS (OUTPATIENT)
Dept: OTHER | Age: 63
End: 2018-10-24

## 2018-10-24 ENCOUNTER — MYAURORA ACCOUNT LINK (OUTPATIENT)
Dept: OTHER | Age: 63
End: 2018-10-24

## 2019-02-21 ENCOUNTER — PRIOR ORIGINAL RECORDS (OUTPATIENT)
Dept: OTHER | Age: 64
End: 2019-02-21

## 2019-02-28 VITALS
HEIGHT: 70 IN | HEART RATE: 65 BPM | DIASTOLIC BLOOD PRESSURE: 70 MMHG | WEIGHT: 299 LBS | SYSTOLIC BLOOD PRESSURE: 138 MMHG | BODY MASS INDEX: 42.8 KG/M2

## 2019-02-28 VITALS
HEIGHT: 70 IN | WEIGHT: 300 LBS | SYSTOLIC BLOOD PRESSURE: 138 MMHG | BODY MASS INDEX: 42.95 KG/M2 | HEART RATE: 68 BPM | DIASTOLIC BLOOD PRESSURE: 82 MMHG

## 2019-02-28 VITALS
HEART RATE: 62 BPM | BODY MASS INDEX: 43.23 KG/M2 | HEIGHT: 70 IN | SYSTOLIC BLOOD PRESSURE: 172 MMHG | WEIGHT: 302 LBS | DIASTOLIC BLOOD PRESSURE: 68 MMHG

## 2019-02-28 VITALS — DIASTOLIC BLOOD PRESSURE: 78 MMHG | HEART RATE: 56 BPM | WEIGHT: 296 LBS | SYSTOLIC BLOOD PRESSURE: 118 MMHG

## 2019-02-28 VITALS
BODY MASS INDEX: 45.32 KG/M2 | HEART RATE: 64 BPM | SYSTOLIC BLOOD PRESSURE: 134 MMHG | HEIGHT: 69 IN | DIASTOLIC BLOOD PRESSURE: 76 MMHG | WEIGHT: 306 LBS

## 2019-02-28 VITALS — SYSTOLIC BLOOD PRESSURE: 144 MMHG | WEIGHT: 303 LBS | DIASTOLIC BLOOD PRESSURE: 82 MMHG | HEART RATE: 70 BPM

## 2019-02-28 VITALS — HEART RATE: 66 BPM | WEIGHT: 300 LBS

## 2019-02-28 VITALS — SYSTOLIC BLOOD PRESSURE: 150 MMHG | WEIGHT: 308 LBS | DIASTOLIC BLOOD PRESSURE: 80 MMHG | HEART RATE: 64 BPM

## 2019-03-01 VITALS
DIASTOLIC BLOOD PRESSURE: 80 MMHG | HEIGHT: 69 IN | WEIGHT: 297 LBS | HEART RATE: 68 BPM | BODY MASS INDEX: 43.99 KG/M2 | SYSTOLIC BLOOD PRESSURE: 130 MMHG

## 2019-04-08 ENCOUNTER — ANCILLARY PROCEDURE (OUTPATIENT)
Dept: CARDIOLOGY | Age: 64
End: 2019-04-08
Attending: INTERNAL MEDICINE

## 2019-04-08 DIAGNOSIS — Z95.810 ICD (IMPLANTABLE CARDIOVERTER-DEFIBRILLATOR) IN PLACE: ICD-10-CM

## 2019-04-12 ENCOUNTER — TELEPHONE (OUTPATIENT)
Dept: CARDIOLOGY | Age: 64
End: 2019-04-12

## 2019-04-17 RX ORDER — MEXILETINE HYDROCHLORIDE 150 MG/1
150 CAPSULE ORAL 2 TIMES DAILY
Qty: 180 CAPSULE | Refills: 1 | Status: SHIPPED | OUTPATIENT
Start: 2019-04-17 | End: 2019-04-24 | Stop reason: ALTCHOICE

## 2019-04-24 ENCOUNTER — TELEPHONE (OUTPATIENT)
Dept: CARDIOLOGY | Age: 64
End: 2019-04-24

## 2019-04-24 RX ORDER — AMIODARONE HYDROCHLORIDE 400 MG/1
400 TABLET ORAL DAILY
Qty: 30 TABLET | Refills: 1 | Status: SHIPPED | OUTPATIENT
Start: 2019-04-24 | End: 2019-07-12 | Stop reason: SDUPTHER

## 2019-04-27 RX ORDER — TORSEMIDE 20 MG/1
TABLET ORAL
COMMUNITY
Start: 2017-09-20 | End: 2019-06-05 | Stop reason: CLARIF

## 2019-04-27 RX ORDER — CLONIDINE HYDROCHLORIDE 0.3 MG/1
TABLET ORAL
COMMUNITY

## 2019-04-27 RX ORDER — CLOPIDOGREL BISULFATE 75 MG/1
TABLET ORAL
COMMUNITY
Start: 2019-01-23 | End: 2019-06-08 | Stop reason: SDUPTHER

## 2019-04-27 RX ORDER — POTASSIUM CHLORIDE 20 MEQ/1
TABLET, EXTENDED RELEASE ORAL
COMMUNITY
Start: 2016-10-04

## 2019-04-27 RX ORDER — ATORVASTATIN CALCIUM 80 MG/1
TABLET, FILM COATED ORAL
COMMUNITY
Start: 2016-09-15

## 2019-04-27 RX ORDER — SERTRALINE HYDROCHLORIDE 100 MG/1
TABLET, FILM COATED ORAL
COMMUNITY

## 2019-04-27 RX ORDER — ISOSORBIDE MONONITRATE 60 MG/1
TABLET, EXTENDED RELEASE ORAL
COMMUNITY
Start: 2018-10-17 | End: 2019-10-28 | Stop reason: SDUPTHER

## 2019-04-27 RX ORDER — LOSARTAN POTASSIUM 100 MG/1
TABLET ORAL
COMMUNITY

## 2019-04-27 RX ORDER — WARFARIN SODIUM 4 MG/1
TABLET ORAL
COMMUNITY
Start: 2018-08-23

## 2019-04-27 RX ORDER — OMEPRAZOLE 20 MG/1
CAPSULE, DELAYED RELEASE ORAL
COMMUNITY

## 2019-04-27 RX ORDER — CARVEDILOL 25 MG/1
TABLET ORAL
COMMUNITY

## 2019-05-06 ENCOUNTER — TELEPHONE (OUTPATIENT)
Dept: CARDIOLOGY | Age: 64
End: 2019-05-06

## 2019-05-24 ENCOUNTER — APPOINTMENT (OUTPATIENT)
Dept: CARDIOLOGY | Age: 64
End: 2019-05-24
Attending: INTERNAL MEDICINE

## 2019-05-29 ENCOUNTER — HOSPITAL ENCOUNTER (OUTPATIENT)
Age: 64
Discharge: HOME | End: 2019-05-29
Payer: MEDICARE

## 2019-05-29 VITALS
BODY MASS INDEX: 42.23 KG/M2 | RESPIRATION RATE: 24 BRPM | HEIGHT: 70 IN | WEIGHT: 295 LBS | DIASTOLIC BLOOD PRESSURE: 94 MMHG | SYSTOLIC BLOOD PRESSURE: 162 MMHG | TEMPERATURE: 98 F | HEART RATE: 63 BPM | OXYGEN SATURATION: 95 %

## 2019-05-29 DIAGNOSIS — L03.019 ONYCHIA AND PARONYCHIA OF FINGER: Primary | ICD-10-CM

## 2019-05-29 PROCEDURE — 10060 I&D ABSCESS SIMPLE/SINGLE: CPT

## 2019-05-29 PROCEDURE — 87070 CULTURE OTHR SPECIMN AEROBIC: CPT | Performed by: NURSE PRACTITIONER

## 2019-05-29 PROCEDURE — 87186 SC STD MICRODIL/AGAR DIL: CPT | Performed by: NURSE PRACTITIONER

## 2019-05-29 PROCEDURE — 87205 SMEAR GRAM STAIN: CPT | Performed by: NURSE PRACTITIONER

## 2019-05-29 PROCEDURE — 87077 CULTURE AEROBIC IDENTIFY: CPT | Performed by: NURSE PRACTITIONER

## 2019-05-29 PROCEDURE — 99214 OFFICE O/P EST MOD 30 MIN: CPT

## 2019-05-29 RX ORDER — CLINDAMYCIN HYDROCHLORIDE 300 MG/1
300 CAPSULE ORAL 3 TIMES DAILY
Qty: 30 CAPSULE | Refills: 0 | Status: SHIPPED | OUTPATIENT
Start: 2019-05-29 | End: 2019-06-08

## 2019-05-29 RX ORDER — OMEPRAZOLE 20 MG/1
20 CAPSULE, DELAYED RELEASE ORAL
COMMUNITY

## 2019-05-29 NOTE — ED PROVIDER NOTES
Patient Seen in: [facility]    History   Patient presents with:  Cellulitis (integumentary, infectious)    Stated Complaint: 5 DAYS RT THUMB PAIN    HPI  Patient is a 78-year-old gentleman with past medical history of hypertensi PAIN  Other systems are as noted in HPI. Constitutional and vital signs reviewed. All other systems reviewed and negative except as noted above.     Physical Exam     ED Triage Vitals [05/29/19 1308]   BP (!) 176/110   Pulse 63   Resp 24   Temp 98.3 ° patient is to seek immediate medical attention should signs occur. Patient is aware that a culture was sent and he will be contacted with results.               Disposition and Plan     Clinical Impression:  Onychia and paronychia of finger  (primary encou

## 2019-06-01 NOTE — ED NOTES
Called to patient, message left to voicemail to call [facility] back.   (this is to notify of final aerobic bacterial result and inquire symptoms for possible change in antibiotic per sensitivity list/ Melony Reynoso NP notes.)

## 2019-06-03 ENCOUNTER — TELEPHONE (OUTPATIENT)
Dept: URGENT CARE | Age: 64
End: 2019-06-03

## 2019-06-05 ENCOUNTER — OFFICE VISIT (OUTPATIENT)
Dept: CARDIOLOGY | Age: 64
End: 2019-06-05

## 2019-06-05 VITALS
WEIGHT: 306 LBS | DIASTOLIC BLOOD PRESSURE: 68 MMHG | BODY MASS INDEX: 45.32 KG/M2 | SYSTOLIC BLOOD PRESSURE: 118 MMHG | HEIGHT: 69 IN | HEART RATE: 62 BPM

## 2019-06-05 DIAGNOSIS — I25.5 ISCHEMIC CARDIOMYOPATHY: ICD-10-CM

## 2019-06-05 DIAGNOSIS — Z95.810 ICD (IMPLANTABLE CARDIOVERTER-DEFIBRILLATOR) IN PLACE: Primary | ICD-10-CM

## 2019-06-05 DIAGNOSIS — Z51.81 ENCOUNTER FOR MONITORING AMIODARONE THERAPY: ICD-10-CM

## 2019-06-05 DIAGNOSIS — I47.20 VENTRICULAR TACHYCARDIA (CMD): ICD-10-CM

## 2019-06-05 DIAGNOSIS — Z79.899 ENCOUNTER FOR MONITORING AMIODARONE THERAPY: ICD-10-CM

## 2019-06-05 PROCEDURE — 93000 ELECTROCARDIOGRAM COMPLETE: CPT | Performed by: INTERNAL MEDICINE

## 2019-06-05 PROCEDURE — 99215 OFFICE O/P EST HI 40 MIN: CPT | Performed by: INTERNAL MEDICINE

## 2019-06-05 RX ORDER — FOLIC ACID/MULTIVIT,IRON,MINER 0.4MG-18MG
1 TABLET ORAL
COMMUNITY

## 2019-06-05 RX ORDER — FUROSEMIDE 20 MG/1
20 TABLET ORAL
COMMUNITY
End: 2019-12-04 | Stop reason: ALTCHOICE

## 2019-06-05 RX ORDER — TORSEMIDE 20 MG/1
40 TABLET ORAL
COMMUNITY
End: 2019-12-20 | Stop reason: ALTCHOICE

## 2019-06-05 RX ORDER — MONTELUKAST SODIUM 10 MG/1
10 TABLET ORAL
COMMUNITY

## 2019-06-05 SDOH — SOCIAL STABILITY: SOCIAL NETWORK: ARE YOU MARRIED, WIDOWED, DIVORCED, SEPARATED, NEVER MARRIED, OR LIVING WITH A PARTNER?: MARRIED

## 2019-06-11 RX ORDER — CLOPIDOGREL BISULFATE 75 MG/1
TABLET ORAL
Qty: 90 TABLET | Refills: 1 | Status: SHIPPED | OUTPATIENT
Start: 2019-06-11 | End: 2019-07-12 | Stop reason: SDUPTHER

## 2019-06-19 ENCOUNTER — ANCILLARY PROCEDURE (OUTPATIENT)
Dept: CARDIOLOGY | Age: 64
End: 2019-06-19
Attending: INTERNAL MEDICINE

## 2019-06-19 ENCOUNTER — ANCILLARY ORDERS (OUTPATIENT)
Dept: CARDIOLOGY | Age: 64
End: 2019-06-19

## 2019-06-19 DIAGNOSIS — Z95.810 ICD (IMPLANTABLE CARDIOVERTER-DEFIBRILLATOR) IN PLACE: ICD-10-CM

## 2019-06-19 PROCEDURE — 93295 DEV INTERROG REMOTE 1/2/MLT: CPT | Performed by: INTERNAL MEDICINE

## 2019-07-12 RX ORDER — CLOPIDOGREL BISULFATE 75 MG/1
TABLET ORAL
Qty: 90 TABLET | Refills: 1 | Status: SHIPPED | OUTPATIENT
Start: 2019-07-12 | End: 2020-01-10 | Stop reason: SDUPTHER

## 2019-07-15 RX ORDER — AMIODARONE HYDROCHLORIDE 400 MG/1
400 TABLET ORAL DAILY
Qty: 30 TABLET | Refills: 4 | Status: SHIPPED | OUTPATIENT
Start: 2019-07-15 | End: 2019-11-15 | Stop reason: SDUPTHER

## 2019-07-16 RX ORDER — AMIODARONE HYDROCHLORIDE 400 MG/1
400 TABLET ORAL DAILY
Qty: 90 TABLET | Refills: 0 | Status: SHIPPED | OUTPATIENT
Start: 2019-07-16 | End: 2019-11-15 | Stop reason: SDUPTHER

## 2019-08-07 ENCOUNTER — HOSPITAL ENCOUNTER (OUTPATIENT)
Dept: GENERAL RADIOLOGY | Age: 64
Discharge: HOME | End: 2019-08-07
Attending: INTERNAL MEDICINE
Payer: MEDICARE

## 2019-08-07 DIAGNOSIS — J21.9 ACUTE BRONCHIOLITIS DUE TO UNSPECIFIED ORGANISM: ICD-10-CM

## 2019-08-07 PROCEDURE — 71046 X-RAY EXAM CHEST 2 VIEWS: CPT | Performed by: INTERNAL MEDICINE

## 2019-08-20 ENCOUNTER — HOSPITAL ENCOUNTER (EMERGENCY)
Facility: HOSPITAL | Age: 64
Discharge: HOME | End: 2019-08-20
Attending: EMERGENCY MEDICINE
Payer: MEDICARE

## 2019-08-20 ENCOUNTER — APPOINTMENT (OUTPATIENT)
Dept: GENERAL RADIOLOGY | Facility: HOSPITAL | Age: 64
End: 2019-08-20
Attending: EMERGENCY MEDICINE
Payer: MEDICARE

## 2019-08-20 VITALS
OXYGEN SATURATION: 91 % | HEART RATE: 62 BPM | BODY MASS INDEX: 41.47 KG/M2 | HEIGHT: 69 IN | RESPIRATION RATE: 16 BRPM | WEIGHT: 280 LBS | DIASTOLIC BLOOD PRESSURE: 79 MMHG | TEMPERATURE: 98 F | SYSTOLIC BLOOD PRESSURE: 146 MMHG

## 2019-08-20 DIAGNOSIS — R05.9 COUGH: Primary | ICD-10-CM

## 2019-08-20 LAB
ADENOVIRUS PCR:: NEGATIVE
ALBUMIN SERPL-MCNC: 3.4 G/DL (ref 3.4–5)
ALBUMIN/GLOB SERPL: 0.9 {RATIO} (ref 1–2)
ALP LIVER SERPL-CCNC: 81 U/L (ref 45–117)
ALT SERPL-CCNC: 26 U/L (ref 16–61)
ANION GAP SERPL CALC-SCNC: 8 MMOL/L (ref 0–18)
AST SERPL-CCNC: 16 U/L (ref 15–37)
B PERT DNA SPEC QL NAA+PROBE: NEGATIVE
BASOPHILS # BLD AUTO: 0.03 X10(3) UL (ref 0–0.2)
BASOPHILS NFR BLD AUTO: 0.4 %
BILIRUB SERPL-MCNC: 0.3 MG/DL (ref 0.1–2)
BUN BLD-MCNC: 14 MG/DL (ref 7–18)
BUN/CREAT SERPL: 12 (ref 10–20)
C PNEUM DNA SPEC QL NAA+PROBE: NEGATIVE
CALCIUM BLD-MCNC: 8.9 MG/DL (ref 8.5–10.1)
CHLORIDE SERPL-SCNC: 106 MMOL/L (ref 98–112)
CO2 SERPL-SCNC: 26 MMOL/L (ref 21–32)
CORONAVIRUS 229E PCR:: NEGATIVE
CORONAVIRUS HKU1 PCR:: NEGATIVE
CORONAVIRUS NL63 PCR:: NEGATIVE
CORONAVIRUS OC43 PCR:: NEGATIVE
CREAT BLD-MCNC: 1.17 MG/DL (ref 0.7–1.3)
DEPRECATED RDW RBC AUTO: 46.4 FL (ref 35.1–46.3)
EOSINOPHIL # BLD AUTO: 0.09 X10(3) UL (ref 0–0.7)
EOSINOPHIL NFR BLD AUTO: 1.2 %
ERYTHROCYTE [DISTWIDTH] IN BLOOD BY AUTOMATED COUNT: 14.6 % (ref 11–15)
FLUAV RNA SPEC QL NAA+PROBE: NEGATIVE
FLUBV RNA SPEC QL NAA+PROBE: NEGATIVE
GLOBULIN PLAS-MCNC: 3.9 G/DL (ref 2.8–4.4)
GLUCOSE BLD-MCNC: 92 MG/DL (ref 70–99)
HCT VFR BLD AUTO: 34.7 % (ref 39–53)
HGB BLD-MCNC: 11.3 G/DL (ref 13–17.5)
IMM GRANULOCYTES # BLD AUTO: 0.03 X10(3) UL (ref 0–1)
IMM GRANULOCYTES NFR BLD: 0.4 %
INR BLD: 2.53 (ref 0.9–1.1)
LYMPHOCYTES # BLD AUTO: 1.34 X10(3) UL (ref 1–4)
LYMPHOCYTES NFR BLD AUTO: 18.6 %
M PROTEIN MFR SERPL ELPH: 7.3 G/DL (ref 6.4–8.2)
MCH RBC QN AUTO: 28.3 PG (ref 26–34)
MCHC RBC AUTO-ENTMCNC: 32.6 G/DL (ref 31–37)
MCV RBC AUTO: 86.8 FL (ref 80–100)
METAPNEUMOVIRUS PCR:: NEGATIVE
MONOCYTES # BLD AUTO: 0.66 X10(3) UL (ref 0.1–1)
MONOCYTES NFR BLD AUTO: 9.2 %
MYCOPLASMA PNEUMONIA PCR:: NEGATIVE
NEUTROPHILS # BLD AUTO: 5.06 X10 (3) UL (ref 1.5–7.7)
NEUTROPHILS # BLD AUTO: 5.06 X10(3) UL (ref 1.5–7.7)
NEUTROPHILS NFR BLD AUTO: 70.2 %
OSMOLALITY SERPL CALC.SUM OF ELEC: 290 MOSM/KG (ref 275–295)
PARAINFLUENZA 1 PCR:: NEGATIVE
PARAINFLUENZA 2 PCR:: NEGATIVE
PARAINFLUENZA 3 PCR:: NEGATIVE
PARAINFLUENZA 4 PCR:: NEGATIVE
PLATELET # BLD AUTO: 334 10(3)UL (ref 150–450)
POTASSIUM SERPL-SCNC: 3.5 MMOL/L (ref 3.5–5.1)
PSA SERPL DL<=0.01 NG/ML-MCNC: 28.9 SECONDS (ref 12.5–14.7)
RBC # BLD AUTO: 4 X10(6)UL (ref 3.8–5.8)
RHINOVIRUS/ENTERO PCR:: NEGATIVE
RSV RNA SPEC QL NAA+PROBE: NEGATIVE
SODIUM SERPL-SCNC: 140 MMOL/L (ref 136–145)
WBC # BLD AUTO: 7.2 X10(3) UL (ref 4–11)

## 2019-08-20 PROCEDURE — 36415 COLL VENOUS BLD VENIPUNCTURE: CPT

## 2019-08-20 PROCEDURE — 85025 COMPLETE CBC W/AUTO DIFF WBC: CPT | Performed by: EMERGENCY MEDICINE

## 2019-08-20 PROCEDURE — 80053 COMPREHEN METABOLIC PANEL: CPT | Performed by: EMERGENCY MEDICINE

## 2019-08-20 PROCEDURE — 94645 CONT INHLJ TX EACH ADDL HOUR: CPT

## 2019-08-20 PROCEDURE — 87999 UNLISTED MICROBIOLOGY PX: CPT

## 2019-08-20 PROCEDURE — 87581 M.PNEUMON DNA AMP PROBE: CPT | Performed by: EMERGENCY MEDICINE

## 2019-08-20 PROCEDURE — 99284 EMERGENCY DEPT VISIT MOD MDM: CPT

## 2019-08-20 PROCEDURE — 94640 AIRWAY INHALATION TREATMENT: CPT

## 2019-08-20 PROCEDURE — 71046 X-RAY EXAM CHEST 2 VIEWS: CPT | Performed by: EMERGENCY MEDICINE

## 2019-08-20 PROCEDURE — 99285 EMERGENCY DEPT VISIT HI MDM: CPT

## 2019-08-20 PROCEDURE — 87633 RESP VIRUS 12-25 TARGETS: CPT | Performed by: EMERGENCY MEDICINE

## 2019-08-20 PROCEDURE — 87486 CHLMYD PNEUM DNA AMP PROBE: CPT | Performed by: EMERGENCY MEDICINE

## 2019-08-20 PROCEDURE — 85610 PROTHROMBIN TIME: CPT | Performed by: EMERGENCY MEDICINE

## 2019-08-20 PROCEDURE — 87798 DETECT AGENT NOS DNA AMP: CPT | Performed by: EMERGENCY MEDICINE

## 2019-08-20 RX ORDER — HYDROCODONE BITARTRATE AND HOMATROPINE METHYLBROMIDE ORAL SOLUTION 5; 1.5 MG/5ML; MG/5ML
5 LIQUID ORAL 4 TIMES DAILY PRN
Qty: 120 ML | Refills: 0 | Status: SHIPPED | OUTPATIENT
Start: 2019-08-20 | End: 2019-09-28 | Stop reason: CLARIF

## 2019-08-20 RX ORDER — LIDOCAINE HYDROCHLORIDE 10 MG/ML
INJECTION, SOLUTION INFILTRATION; PERINEURAL
Status: COMPLETED
Start: 2019-08-20 | End: 2019-08-20

## 2019-08-20 RX ORDER — ALBUTEROL SULFATE 2.5 MG/3ML
10 SOLUTION RESPIRATORY (INHALATION) CONTINUOUS
Status: DISCONTINUED | OUTPATIENT
Start: 2019-08-20 | End: 2019-08-20

## 2019-08-20 RX ORDER — IPRATROPIUM BROMIDE AND ALBUTEROL SULFATE 2.5; .5 MG/3ML; MG/3ML
3 SOLUTION RESPIRATORY (INHALATION) ONCE
Status: COMPLETED | OUTPATIENT
Start: 2019-08-20 | End: 2019-08-20

## 2019-08-20 NOTE — ED PROVIDER NOTES
Patient Seen in: [facility] Emergency Department    History   Patient presents with:  Cough/URI    Stated Complaint: COUGH    HPI    39-year-old white male who presents to the emergency room today for complaint of cough.   Patient states she is had a complaint: COUGH  Other systems are as noted in HPI. Constitutional and vital signs reviewed. All other systems reviewed and negative except as noted above.     Physical Exam     ED Triage Vitals   BP 08/20/19 0752 (!) 133/100   Pulse 08/20/19 0742 80 components within normal limits   CBC WITH DIFFERENTIAL WITH PLATELET    Narrative: The following orders were created for panel order CBC WITH DIFFERENTIAL WITH PLATELET.   Procedure                               Abnormality         Status problems.               Disposition and Plan     Clinical Impression:  Cough  (primary encounter diagnosis)    Disposition:  Discharge  8/20/2019  9:41 am    Follow-up:  Srinath Casey, [address] [id number]    In 2 days

## 2019-09-07 ENCOUNTER — APPOINTMENT (OUTPATIENT)
Dept: GENERAL RADIOLOGY | Facility: HOSPITAL | Age: 64
End: 2019-09-07
Attending: EMERGENCY MEDICINE
Payer: MEDICARE

## 2019-09-07 ENCOUNTER — HOSPITAL ENCOUNTER (EMERGENCY)
Facility: HOSPITAL | Age: 64
Discharge: HOME | End: 2019-09-07
Attending: EMERGENCY MEDICINE
Payer: MEDICARE

## 2019-09-07 VITALS
SYSTOLIC BLOOD PRESSURE: 181 MMHG | HEART RATE: 61 BPM | HEIGHT: 69 IN | OXYGEN SATURATION: 96 % | RESPIRATION RATE: 24 BRPM | DIASTOLIC BLOOD PRESSURE: 88 MMHG | BODY MASS INDEX: 40.43 KG/M2 | WEIGHT: 273 LBS | TEMPERATURE: 97 F

## 2019-09-07 DIAGNOSIS — R05.9 COUGH: Primary | ICD-10-CM

## 2019-09-07 LAB
ALBUMIN SERPL-MCNC: 3.4 G/DL (ref 3.4–5)
ALBUMIN/GLOB SERPL: 0.8 {RATIO} (ref 1–2)
ALP LIVER SERPL-CCNC: 103 U/L (ref 45–117)
ALT SERPL-CCNC: 44 U/L (ref 16–61)
ANION GAP SERPL CALC-SCNC: 9 MMOL/L (ref 0–18)
AST SERPL-CCNC: 26 U/L (ref 15–37)
BASOPHILS # BLD AUTO: 0.06 X10(3) UL (ref 0–0.2)
BASOPHILS NFR BLD AUTO: 0.8 %
BILIRUB SERPL-MCNC: 0.3 MG/DL (ref 0.1–2)
BUN BLD-MCNC: 12 MG/DL (ref 7–18)
BUN/CREAT SERPL: 9.7 (ref 10–20)
CALCIUM BLD-MCNC: 8.7 MG/DL (ref 8.5–10.1)
CHLORIDE SERPL-SCNC: 105 MMOL/L (ref 98–112)
CO2 SERPL-SCNC: 25 MMOL/L (ref 21–32)
CREAT BLD-MCNC: 1.24 MG/DL (ref 0.7–1.3)
DEPRECATED RDW RBC AUTO: 44.9 FL (ref 35.1–46.3)
EOSINOPHIL # BLD AUTO: 0.11 X10(3) UL (ref 0–0.7)
EOSINOPHIL NFR BLD AUTO: 1.5 %
ERYTHROCYTE [DISTWIDTH] IN BLOOD BY AUTOMATED COUNT: 14.2 % (ref 11–15)
GLOBULIN PLAS-MCNC: 4.2 G/DL (ref 2.8–4.4)
GLUCOSE BLD-MCNC: 128 MG/DL (ref 70–99)
HCT VFR BLD AUTO: 39.2 % (ref 39–53)
HGB BLD-MCNC: 12.5 G/DL (ref 13–17.5)
IMM GRANULOCYTES # BLD AUTO: 0.02 X10(3) UL (ref 0–1)
IMM GRANULOCYTES NFR BLD: 0.3 %
INR BLD: 4.29 (ref 0.9–1.1)
LYMPHOCYTES # BLD AUTO: 1.73 X10(3) UL (ref 1–4)
LYMPHOCYTES NFR BLD AUTO: 24.1 %
M PROTEIN MFR SERPL ELPH: 7.6 G/DL (ref 6.4–8.2)
MCH RBC QN AUTO: 27.6 PG (ref 26–34)
MCHC RBC AUTO-ENTMCNC: 31.9 G/DL (ref 31–37)
MCV RBC AUTO: 86.5 FL (ref 80–100)
MONOCYTES # BLD AUTO: 0.57 X10(3) UL (ref 0.1–1)
MONOCYTES NFR BLD AUTO: 7.9 %
NEUTROPHILS # BLD AUTO: 4.7 X10 (3) UL (ref 1.5–7.7)
NEUTROPHILS # BLD AUTO: 4.7 X10(3) UL (ref 1.5–7.7)
NEUTROPHILS NFR BLD AUTO: 65.4 %
NT-PROBNP SERPL-MCNC: 610 PG/ML (ref ?–125)
OSMOLALITY SERPL CALC.SUM OF ELEC: 289 MOSM/KG (ref 275–295)
PLATELET # BLD AUTO: 334 10(3)UL (ref 150–450)
POTASSIUM SERPL-SCNC: 3.5 MMOL/L (ref 3.5–5.1)
PSA SERPL DL<=0.01 NG/ML-MCNC: 44.4 SECONDS (ref 12.5–14.7)
RBC # BLD AUTO: 4.53 X10(6)UL (ref 3.8–5.8)
SODIUM SERPL-SCNC: 139 MMOL/L (ref 136–145)
TROPONIN I SERPL-MCNC: <0.045 NG/ML (ref ?–0.04)
WBC # BLD AUTO: 7.2 X10(3) UL (ref 4–11)

## 2019-09-07 PROCEDURE — 84484 ASSAY OF TROPONIN QUANT: CPT | Performed by: EMERGENCY MEDICINE

## 2019-09-07 PROCEDURE — 99284 EMERGENCY DEPT VISIT MOD MDM: CPT

## 2019-09-07 PROCEDURE — 71046 X-RAY EXAM CHEST 2 VIEWS: CPT | Performed by: EMERGENCY MEDICINE

## 2019-09-07 PROCEDURE — 83880 ASSAY OF NATRIURETIC PEPTIDE: CPT | Performed by: EMERGENCY MEDICINE

## 2019-09-07 PROCEDURE — 80053 COMPREHEN METABOLIC PANEL: CPT | Performed by: EMERGENCY MEDICINE

## 2019-09-07 PROCEDURE — 85610 PROTHROMBIN TIME: CPT | Performed by: EMERGENCY MEDICINE

## 2019-09-07 PROCEDURE — 99285 EMERGENCY DEPT VISIT HI MDM: CPT

## 2019-09-07 PROCEDURE — 36415 COLL VENOUS BLD VENIPUNCTURE: CPT

## 2019-09-07 PROCEDURE — 93010 ELECTROCARDIOGRAM REPORT: CPT

## 2019-09-07 PROCEDURE — 85025 COMPLETE CBC W/AUTO DIFF WBC: CPT | Performed by: EMERGENCY MEDICINE

## 2019-09-07 PROCEDURE — 93005 ELECTROCARDIOGRAM TRACING: CPT

## 2019-09-07 RX ORDER — PREDNISONE 20 MG/1
40 TABLET ORAL DAILY
Qty: 10 TABLET | Refills: 0 | Status: SHIPPED | OUTPATIENT
Start: 2019-09-07 | End: 2019-09-12

## 2019-09-07 RX ORDER — PREDNISONE 20 MG/1
40 TABLET ORAL ONCE
Status: COMPLETED | OUTPATIENT
Start: 2019-09-07 | End: 2019-09-07

## 2019-09-07 RX ORDER — ALBUTEROL SULFATE 90 UG/1
2 AEROSOL, METERED RESPIRATORY (INHALATION) EVERY 4 HOURS PRN
Qty: 1 INHALER | Refills: 0 | Status: SHIPPED | OUTPATIENT
Start: 2019-09-07 | End: 2019-09-28 | Stop reason: CLARIF

## 2019-09-07 NOTE — ED PROVIDER NOTES
Patient Seen in: [facility] Emergency Department    History   Patient presents with:  Dyspnea ERMIAS SOB (respiratory)    Stated Complaint: cough, recent bronchitis treatment    HPI  Patient is a 18-year-old male who states he has had a cough for a mon SURGERY  2000   • CABG     • CARDIAC DEFIBRILLATOR PLACEMENT      [employer] single lead ICD   • CARDIAC PACEMAKER PLACEMENT     • CATH DRUG ELUTING STENT     • EP VT ABLATION  10/19/2018    DR. GOYAL   • FRACTURE SURGERY     • KNEE REPLACEMENT SURG within normal limits   PRO BETA NATRIURETIC PEPTIDE - Abnormal; Notable for the following components:    Pro-Beta Natriuretic Peptide 610 (*)     All other components within normal limits   PROTHROMBIN TIME (PT) - Abnormal; Notable for the following compon am    Follow-up:  Valentin Horton MD  [address]  [facility] [id number]    In 2 days      Tamie Bacon MD  [phone]    In 2 days      Ramez Aguilera MD  [address] [address]

## 2019-09-09 LAB
ATRIAL RATE: 63 BPM
P AXIS: 30 DEGREES
P-R INTERVAL: 196 MS
Q-T INTERVAL: 422 MS
QRS DURATION: 108 MS
QTC CALCULATION (BEZET): 431 MS
R AXIS: -6 DEGREES
T AXIS: 45 DEGREES
VENTRICULAR RATE: 63 BPM

## 2019-09-27 ENCOUNTER — TELEPHONE (OUTPATIENT)
Dept: CARDIOLOGY | Age: 64
End: 2019-09-27

## 2019-09-28 ENCOUNTER — APPOINTMENT (OUTPATIENT)
Dept: GENERAL RADIOLOGY | Facility: HOSPITAL | Age: 64
End: 2019-09-28
Attending: EMERGENCY MEDICINE
Payer: MEDICARE

## 2019-09-28 ENCOUNTER — HOSPITAL ENCOUNTER (OUTPATIENT)
Facility: HOSPITAL | Age: 64
Setting detail: OBSERVATION
Discharge: HOME | End: 2019-09-30
Attending: EMERGENCY MEDICINE | Admitting: HOSPITALIST
Payer: MEDICARE

## 2019-09-28 DIAGNOSIS — J18.9 COMMUNITY ACQUIRED PNEUMONIA OF RIGHT LOWER LOBE OF LUNG: Primary | ICD-10-CM

## 2019-09-28 DIAGNOSIS — J98.01 ACUTE BRONCHOSPASM: ICD-10-CM

## 2019-09-28 DIAGNOSIS — T74.91XA DOMESTIC VIOLENCE OF ADULT, INITIAL ENCOUNTER: ICD-10-CM

## 2019-09-28 DIAGNOSIS — S80.01XA CONTUSION OF RIGHT KNEE, INITIAL ENCOUNTER: ICD-10-CM

## 2019-09-28 DIAGNOSIS — Z86.79 HISTORY OF CHF (CONGESTIVE HEART FAILURE): ICD-10-CM

## 2019-09-28 PROCEDURE — 99220 INITIAL OBSERVATION CARE,LEVL III: CPT | Performed by: HOSPITALIST

## 2019-09-28 PROCEDURE — 71045 X-RAY EXAM CHEST 1 VIEW: CPT | Performed by: EMERGENCY MEDICINE

## 2019-09-28 PROCEDURE — 73560 X-RAY EXAM OF KNEE 1 OR 2: CPT | Performed by: EMERGENCY MEDICINE

## 2019-09-28 RX ORDER — LEVOFLOXACIN 5 MG/ML
750 INJECTION, SOLUTION INTRAVENOUS EVERY 24 HOURS
Status: DISCONTINUED | OUTPATIENT
Start: 2019-09-29 | End: 2019-09-30

## 2019-09-28 RX ORDER — ATORVASTATIN CALCIUM 80 MG/1
80 TABLET, FILM COATED ORAL DAILY
Status: DISCONTINUED | OUTPATIENT
Start: 2019-09-28 | End: 2019-09-30

## 2019-09-28 RX ORDER — SERTRALINE HYDROCHLORIDE 100 MG/1
100 TABLET, FILM COATED ORAL DAILY
Status: DISCONTINUED | OUTPATIENT
Start: 2019-09-29 | End: 2019-09-30

## 2019-09-28 RX ORDER — ISOSORBIDE MONONITRATE 60 MG/1
90 TABLET, EXTENDED RELEASE ORAL DAILY
Status: ON HOLD | COMMUNITY
End: 2019-09-30

## 2019-09-28 RX ORDER — METHYLPREDNISOLONE SODIUM SUCCINATE 125 MG/2ML
125 INJECTION, POWDER, LYOPHILIZED, FOR SOLUTION INTRAMUSCULAR; INTRAVENOUS ONCE
Status: COMPLETED | OUTPATIENT
Start: 2019-09-28 | End: 2019-09-28

## 2019-09-28 RX ORDER — LOSARTAN POTASSIUM 100 MG/1
100 TABLET ORAL DAILY
Status: DISCONTINUED | OUTPATIENT
Start: 2019-09-29 | End: 2019-09-30

## 2019-09-28 RX ORDER — PANTOPRAZOLE SODIUM 20 MG/1
20 TABLET, DELAYED RELEASE ORAL
Status: DISCONTINUED | OUTPATIENT
Start: 2019-09-29 | End: 2019-09-30

## 2019-09-28 RX ORDER — ACETAMINOPHEN 500 MG
TABLET ORAL EVERY 6 HOURS PRN
COMMUNITY

## 2019-09-28 RX ORDER — AMIODARONE HYDROCHLORIDE 200 MG/1
200 TABLET ORAL DAILY
Status: DISCONTINUED | OUTPATIENT
Start: 2019-09-29 | End: 2019-09-30

## 2019-09-28 RX ORDER — POTASSIUM CHLORIDE 20 MEQ/1
40 TABLET, EXTENDED RELEASE ORAL EVERY 4 HOURS
Status: COMPLETED | OUTPATIENT
Start: 2019-09-28 | End: 2019-09-28

## 2019-09-28 RX ORDER — LEVOFLOXACIN 5 MG/ML
750 INJECTION, SOLUTION INTRAVENOUS ONCE
Status: COMPLETED | OUTPATIENT
Start: 2019-09-28 | End: 2019-09-28

## 2019-09-28 RX ORDER — ISOSORBIDE MONONITRATE 60 MG/1
120 TABLET, EXTENDED RELEASE ORAL NIGHTLY
Status: DISCONTINUED | OUTPATIENT
Start: 2019-09-28 | End: 2019-09-30

## 2019-09-28 RX ORDER — ATORVASTATIN CALCIUM 80 MG/1
80 TABLET, FILM COATED ORAL NIGHTLY
COMMUNITY

## 2019-09-28 RX ORDER — ONDANSETRON 2 MG/ML
4 INJECTION INTRAMUSCULAR; INTRAVENOUS EVERY 6 HOURS PRN
Status: DISCONTINUED | OUTPATIENT
Start: 2019-09-28 | End: 2019-09-30

## 2019-09-28 RX ORDER — CARVEDILOL 12.5 MG/1
25 TABLET ORAL 2 TIMES DAILY WITH MEALS
Status: DISCONTINUED | OUTPATIENT
Start: 2019-09-28 | End: 2019-09-30

## 2019-09-28 RX ORDER — FUROSEMIDE 20 MG/1
20 TABLET ORAL EVERY EVENING
Status: DISCONTINUED | OUTPATIENT
Start: 2019-09-28 | End: 2019-09-30

## 2019-09-28 RX ORDER — DEXTROSE MONOHYDRATE 25 G/50ML
50 INJECTION, SOLUTION INTRAVENOUS
Status: DISCONTINUED | OUTPATIENT
Start: 2019-09-28 | End: 2019-09-30

## 2019-09-28 RX ORDER — IPRATROPIUM BROMIDE AND ALBUTEROL SULFATE 2.5; .5 MG/3ML; MG/3ML
3 SOLUTION RESPIRATORY (INHALATION) ONCE
Status: COMPLETED | OUTPATIENT
Start: 2019-09-28 | End: 2019-09-28

## 2019-09-28 RX ORDER — MONTELUKAST SODIUM 10 MG/1
10 TABLET ORAL NIGHTLY
Status: DISCONTINUED | OUTPATIENT
Start: 2019-09-28 | End: 2019-09-30

## 2019-09-28 RX ORDER — FUROSEMIDE 10 MG/ML
40 INJECTION INTRAMUSCULAR; INTRAVENOUS ONCE
Status: COMPLETED | OUTPATIENT
Start: 2019-09-28 | End: 2019-09-28

## 2019-09-28 RX ORDER — ACETAMINOPHEN 325 MG/1
650 TABLET ORAL EVERY 6 HOURS PRN
Status: DISCONTINUED | OUTPATIENT
Start: 2019-09-28 | End: 2019-09-30

## 2019-09-28 RX ORDER — CLOPIDOGREL BISULFATE 75 MG/1
75 TABLET ORAL DAILY
Status: DISCONTINUED | OUTPATIENT
Start: 2019-09-28 | End: 2019-09-30

## 2019-09-28 NOTE — PROGRESS NOTES
[facility] Pharmacy Note:  Renal Adjustment for levofloxacin [facility])    Lauren Hughes is a 75year old male who has been prescribed levofloxacin (LEVAQUIN) 500 mg once. CrCl is estimated creatinine clearance is 61.5 mL/min (based on SCr of 1.22 mg/dL).  s

## 2019-09-28 NOTE — H&P
[facility] HOSPITALIST  History and Physical     Lauren Kimberlys Patient Status:  Emergency    1955 MRN [id number]   Location [address] [facility] Attending Evangelina Ewing MD   Hosp Day # 0 PCP Gamaliel Brito MD     Chief Compla Father    • Stroke Father    • Cancer Father    • Cancer Mother    • Cancer Maternal Grandfather    • Heart Disease Paternal Grandfather    • Heart Disease Brother    • Heart Disease Self    • Cancer Son         Allergies:   Penicillins             HIVES, by mouth nightly. Disp:  Rfl:    Losartan Potassium 100 MG Oral Tab Take 100 mg by mouth daily. Disp:  Rfl:    carvedilol 25 MG Oral Tab Take 25 mg by mouth 2 (two) times daily with meals.  Disp:  Rfl:    Sertraline HCl 100 MG Oral Tab Take 100 mg by mo 1. XR negative  2. PT eval  3. CAD s/p CABG  1. Plavix/BB/statin/ARB  4. PE history   1. Coumadin  2. Check INR  5. DMII  1. SSI  6. Dyslipidemia  1. Statin  7. Essential HTN  1. Continue home meds  8. Morbid obesity  1.  BMI 40    Quality:  · DVT Prophyl

## 2019-09-28 NOTE — PROGRESS NOTES
Pharmacy Dosing Service: Warfarin (Coumadin)    Aidan Plasencia is an 75year old male with a history of PE (on outpatient coumadin). Pharmacy was consulted to dose warfarin (COUMADIN) by Dr. Paula Sims today. Based on this indication, goal INR is 2-3.
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
## 2019-09-28 NOTE — PROGRESS NOTES
NURSING ADMISSION NOTE      Patient admitted via Cart  Oriented to room. Safety precautions initiated. Bed in low position. Call light in reach. Pt admitted to room from ED. Admission navigator complete. Pt is aox4, VSS, afebrile.  Maggy Aldrich has been

## 2019-09-28 NOTE — PROGRESS NOTES
09/28/19 4267   Clinical Encounter Type   Visited With Patient  ( responded to Advanced Directives consult by giving the patient the POA for Kary Gandhi 14 short form with directions for him to consider completing at his convenience.)   Con

## 2019-09-29 ENCOUNTER — APPOINTMENT (OUTPATIENT)
Dept: GENERAL RADIOLOGY | Facility: HOSPITAL | Age: 64
End: 2019-09-29
Attending: HOSPITALIST
Payer: MEDICARE

## 2019-09-29 PROCEDURE — 99225 SUBSEQUENT OBSERVATION CARE: CPT | Performed by: HOSPITALIST

## 2019-09-29 PROCEDURE — 71110 X-RAY EXAM RIBS BIL 3 VIEWS: CPT | Performed by: HOSPITALIST

## 2019-09-29 RX ORDER — POTASSIUM CHLORIDE 20 MEQ/1
40 TABLET, EXTENDED RELEASE ORAL ONCE
Status: COMPLETED | OUTPATIENT
Start: 2019-09-29 | End: 2019-09-29

## 2019-09-29 RX ORDER — PREDNISONE 20 MG/1
20 TABLET ORAL
Status: DISCONTINUED | OUTPATIENT
Start: 2019-09-29 | End: 2019-09-30

## 2019-09-29 RX ORDER — ENOXAPARIN SODIUM 100 MG/ML
40 INJECTION SUBCUTANEOUS DAILY
Status: DISCONTINUED | OUTPATIENT
Start: 2019-09-29 | End: 2019-09-30

## 2019-09-29 NOTE — PLAN OF CARE
Pt is aox4, VSS. Afebrile. Ra, FAISAL cpap. Pt has cough but refuses to take robitussin. Tele NSR. Voids in urinal at bedside. Up with walker due to R knee pain, requesting crutches from Pt. PT request in. QID accucheck. IV Levaquin. Pt tolerating diet well.

## 2019-09-29 NOTE — PLAN OF CARE
Received patient a/ox4. O2 sats on RA 95%. No new complaints. He was updated on plan of care and verbalizes understanding.       Problem: RESPIRATORY - ADULT  Goal: Achieves optimal ventilation and oxygenation  Description  INTERVENTIONS:  - Assess for mullen

## 2019-09-29 NOTE — PROGRESS NOTES
[facility] HOSPITALIST  Progress Note     [address] Patient Status:  Observation    1955 MRN [id number]   [facility] 5NW-A Attending Liset Graham, [address] Day # 0 PCP Kendra Coleman MD     Chief Complaint: Cough/SOB/knee pain TROP <0.045            Imaging: Imaging data reviewed in Epic.     Medications:   • Warfarin Sodium  3 mg Oral Once at night   • predniSONE  20 mg Oral Daily with breakfast   • amiodarone HCl  200 mg Oral Daily   • atorvastatin  80 mg Oral Daily   • manasa

## 2019-09-29 NOTE — RESPIRATORY THERAPY NOTE
FAISAL - Equipment Use Daily Summary:  · Set Mode CPAP  · Usage in hours: 4:30  · 90% Pressure (EPAP) level: 9.0  · 90% Insp Pressure (IPAP):   · AHI: 2.2  · Supplemental Oxygen:   · Comments:

## 2019-09-29 NOTE — PROGRESS NOTES
[facility] Dosing Service  Warfarin (Coumadin) Subsequent Dosing    Anabel Sebastian is a 75year old male for whom pharmacy has been dosing warfarin (Coumadin).  Goal INR is 2-3    Recent Labs   Lab 09/28/19  1229 09/29/19  0559   INR 1.60* 1.66*     P

## 2019-09-30 ENCOUNTER — ANCILLARY PROCEDURE (OUTPATIENT)
Dept: CARDIOLOGY | Age: 64
End: 2019-09-30
Attending: INTERNAL MEDICINE

## 2019-09-30 ENCOUNTER — ANCILLARY ORDERS (OUTPATIENT)
Dept: CARDIOLOGY | Age: 64
End: 2019-09-30

## 2019-09-30 VITALS
HEART RATE: 58 BPM | DIASTOLIC BLOOD PRESSURE: 86 MMHG | WEIGHT: 279.13 LBS | HEIGHT: 70 IN | RESPIRATION RATE: 22 BRPM | TEMPERATURE: 99 F | SYSTOLIC BLOOD PRESSURE: 146 MMHG | BODY MASS INDEX: 39.96 KG/M2 | OXYGEN SATURATION: 97 %

## 2019-09-30 DIAGNOSIS — Z95.810 ICD (IMPLANTABLE CARDIOVERTER-DEFIBRILLATOR) IN PLACE: ICD-10-CM

## 2019-09-30 PROCEDURE — 93295 DEV INTERROG REMOTE 1/2/MLT: CPT | Performed by: INTERNAL MEDICINE

## 2019-09-30 PROCEDURE — 99217 OBSERVATION CARE DISCHARGE: CPT | Performed by: INTERNAL MEDICINE

## 2019-09-30 PROCEDURE — X1114 CARDIAC DEVICE HOME CHECK - REMOTE UNSCHEDULED: HCPCS | Performed by: INTERNAL MEDICINE

## 2019-09-30 RX ORDER — LEVOFLOXACIN 750 MG/1
750 TABLET ORAL EVERY 24 HOURS
Qty: 5 TABLET | Refills: 0 | Status: SHIPPED | OUTPATIENT
Start: 2019-09-30 | End: 2019-10-05

## 2019-09-30 RX ORDER — POTASSIUM CHLORIDE 20 MEQ/1
40 TABLET, EXTENDED RELEASE ORAL EVERY 4 HOURS
Status: DISPENSED | OUTPATIENT
Start: 2019-09-30 | End: 2019-09-30

## 2019-09-30 RX ORDER — PREDNISONE 20 MG/1
20 TABLET ORAL
Qty: 2 TABLET | Refills: 0 | Status: SHIPPED | OUTPATIENT
Start: 2019-10-01 | End: 2019-10-03

## 2019-09-30 RX ORDER — ISOSORBIDE MONONITRATE 120 MG/1
120 TABLET, EXTENDED RELEASE ORAL NIGHTLY
Qty: 30 TABLET | Refills: 0 | Status: SHIPPED | OUTPATIENT
Start: 2019-09-30

## 2019-09-30 RX ORDER — LEVOFLOXACIN 750 MG/1
750 TABLET ORAL EVERY 24 HOURS
Status: DISCONTINUED | OUTPATIENT
Start: 2019-09-30 | End: 2019-09-30

## 2019-09-30 NOTE — PROGRESS NOTES
Multidisciplinary Discharge Rounds held 9/30/2019. Treatment team members present today include , , Charge Nurse, Nurse, RT, PT and Pharmacy caring for [employer].      Other care providers present:    Mobility Goal:    R

## 2019-09-30 NOTE — PROGRESS NOTES
[facility] Dosing Service  Warfarin (Coumadin) Subsequent Dosing    Lula Valerio is a 75year old male for whom pharmacy has been dosing warfarin (Coumadin). Goal INR is 2-3    Recent Labs   Lab 09/28/19  1229 09/29/19  0559 09/30/19  0611   INR 1.

## 2019-09-30 NOTE — RESPIRATORY THERAPY NOTE
FAISAL : EQUIPMENT USE: DAILY SUMMARY                                            SET MODE: CPAP                                          USAGE IN HOURS: 12:36                                          90% EXP. PRESSURE(

## 2019-09-30 NOTE — PROGRESS NOTES
09/29/19 2206   BiPAP   $ RT Standby Charge (per 15 min) 1   $ FAISAL Follow up charge Yes   Device RemStar - CPAP   Mode Spontaneous   Interface Full face mask   Mask Size Medium   Control Settings   Set CPAP 9   SIPAP   Resp 18   BiPAP/CPAP Monitored Par

## 2019-09-30 NOTE — DISCHARGE SUMMARY
[facility] PSYCHIATRIC [place] HOSPITALIST  DISCHARGE SUMMARY     Mya Gresham Patient Status:  Observation    1955 MRN [id number]   [facility] 5NW-A Attending Gen Castillo MD   [place] Day # 0 PCP Donnell Chauhan MD     Date of Admission: 2019  [place] for 2 days.    Stop taking on:  10/3/2019  Quantity:  2 tablet  Refills:  0        CHANGE how you take these medications      Instructions Prescription details   Isosorbide Mononitrate  MG Tb24  Commonly known as:  IMDUR  What changed:    · medication mg by mouth every morning before breakfast.   Refills:  0     Potassium Chloride ER 20 MEQ Tbcr  Commonly known as:  K-DUR M20      Take 1 tablet (20 mEq total) by mouth daily.    Quantity:  30 tablet  Refills:  1     Sertraline HCl 100 MG Tabs  Commonly kn

## 2019-09-30 NOTE — PHYSICAL THERAPY NOTE
PHYSICAL THERAPY QUICK EVALUATION - INPATIENT    Room Number: 228/604-E  Evaluation Date: 9/30/2019  Presenting Problem: R knee pain  Physician Order: PT Eval and Treat    Problem List  Principal Problem:    Community acquired pneumonia of right lower lo promotion; Body mechanics; Relaxation;Repositioning   RANGE OF MOTION AND STRENGTH ASSESSMENT    Lower extremity ROM is within functional limits     Lower extremity strength is within functional limits     NEUROLOGICAL FINDINGS                      ACTIVITY session/findings; All patient questions and concerns addressed    ASSESSMENT   Patient is a 75year old male admitted on 9/28/2019 for R knee pain. Pertinent comorbidities and personal factors impacting therapy include none.   Functional outcome measures co

## 2019-09-30 NOTE — CM/SW NOTE
09/30/19 1155   CM/SW Screening   Referral [address] [facility] [facility] staff; Chart review;Nursing rounds   Patient's Mental Status Alert;Oriented   Patient's Home Environment House   Patient Status Prior to Admission   Independent with ADL

## 2019-09-30 NOTE — PLAN OF CARE
Problem: RESPIRATORY - ADULT  Goal: Achieves optimal ventilation and oxygenation  Description  INTERVENTIONS:  - Assess for changes in respiratory status  - Assess for changes in mentation and behavior  - Position to facilitate oxygenation and minimize r level and precautions  - Ambulate 3+ times per day in hallway with RW   Outcome: Adequate for Discharge   Pt given d/c order. IV d/c'd. Belongings packed up. F/U understood. Paperwork printed and all questions and concerns addressed.  W/C ride to [place]

## 2019-09-30 NOTE — PROGRESS NOTES
[facility] Pharmacy Note: Route Optimization for Levofloxacin [facility])    Patient is currently on Levofloxacin (LEVAQUIN) 750 mg IV every 24 hours.    The patient meets the criteria to convert to the oral equivalent as established by the IV to Oral conversion pr

## 2019-09-30 NOTE — PROGRESS NOTES
[facility]PITAL[facility]  Progress Note     Peggy Magallon Patient Status:  Observation    1955 MRN [id number]   [facility] 5NW-A Attending Adela Olivo, [address] [facility] Day # 0 PCP Kael Hernandes MD     Chief Complaint: Cough/SOB/knee pain mg/dL). Recent Labs   Lab 09/28/19  1229 09/29/19  0559 09/30/19  0611   PTP 19.9* 20.5* 26.1*   INR 1.60* 1.66* 2.23*       Recent Labs   Lab 09/28/19  1229   TROP <0.045            Imaging: Imaging data reviewed in Epic.     Medications:   • Potassium

## 2019-10-01 ENCOUNTER — TELEPHONE (OUTPATIENT)
Dept: CASE MANAGEMENT | Age: 64
End: 2019-10-01

## 2019-10-29 RX ORDER — ISOSORBIDE MONONITRATE 60 MG/1
TABLET, EXTENDED RELEASE ORAL
Qty: 135 TABLET | Refills: 0 | Status: SHIPPED | OUTPATIENT
Start: 2019-10-29 | End: 2019-12-20 | Stop reason: ALTCHOICE

## 2019-11-02 DIAGNOSIS — Z79.899 ENCOUNTER FOR MONITORING AMIODARONE THERAPY: Primary | ICD-10-CM

## 2019-11-02 DIAGNOSIS — Z51.81 ENCOUNTER FOR MONITORING AMIODARONE THERAPY: Primary | ICD-10-CM

## 2019-11-04 RX ORDER — AMIODARONE HYDROCHLORIDE 400 MG/1
400 TABLET ORAL DAILY
Qty: 30 TABLET | Refills: 0 | Status: SHIPPED | OUTPATIENT
Start: 2019-11-04 | End: 2019-11-15 | Stop reason: SDUPTHER

## 2019-11-05 ENCOUNTER — HOSPITAL ENCOUNTER (INPATIENT)
Facility: HOSPITAL | Age: 64
LOS: 2 days | Discharge: HOME | DRG: 202 | End: 2019-11-07
Attending: EMERGENCY MEDICINE | Admitting: HOSPITALIST
Payer: MEDICARE

## 2019-11-05 ENCOUNTER — APPOINTMENT (OUTPATIENT)
Dept: GENERAL RADIOLOGY | Facility: HOSPITAL | Age: 64
DRG: 202 | End: 2019-11-05
Attending: EMERGENCY MEDICINE
Payer: MEDICARE

## 2019-11-05 DIAGNOSIS — R09.02 HYPOXIA: ICD-10-CM

## 2019-11-05 DIAGNOSIS — I50.9 ACUTE CONGESTIVE HEART FAILURE, UNSPECIFIED HEART FAILURE TYPE (HCC): Primary | ICD-10-CM

## 2019-11-05 DIAGNOSIS — I48.91 ATRIAL FIBRILLATION, UNSPECIFIED TYPE (HCC): ICD-10-CM

## 2019-11-05 PROCEDURE — 71045 X-RAY EXAM CHEST 1 VIEW: CPT | Performed by: EMERGENCY MEDICINE

## 2019-11-05 PROCEDURE — 99223 1ST HOSP IP/OBS HIGH 75: CPT | Performed by: HOSPITALIST

## 2019-11-05 PROCEDURE — 5A09357 ASSISTANCE WITH RESPIRATORY VENTILATION, LESS THAN 24 CONSECUTIVE HOURS, CONTINUOUS POSITIVE AIRWAY PRESSURE: ICD-10-PCS | Performed by: INTERNAL MEDICINE

## 2019-11-05 RX ORDER — IPRATROPIUM BROMIDE AND ALBUTEROL SULFATE 2.5; .5 MG/3ML; MG/3ML
3 SOLUTION RESPIRATORY (INHALATION) ONCE
Status: COMPLETED | OUTPATIENT
Start: 2019-11-05 | End: 2019-11-05

## 2019-11-05 RX ORDER — FUROSEMIDE 10 MG/ML
40 INJECTION INTRAMUSCULAR; INTRAVENOUS ONCE
Status: COMPLETED | OUTPATIENT
Start: 2019-11-05 | End: 2019-11-05

## 2019-11-05 RX ORDER — WARFARIN SODIUM 4 MG/1
4 TABLET ORAL SEE ADMIN INSTRUCTIONS
COMMUNITY

## 2019-11-05 RX ORDER — NITROGLYCERIN 0.4 MG/1
0.4 TABLET SUBLINGUAL ONCE
Status: COMPLETED | OUTPATIENT
Start: 2019-11-05 | End: 2019-11-05

## 2019-11-06 ENCOUNTER — APPOINTMENT (OUTPATIENT)
Dept: CV DIAGNOSTICS | Facility: HOSPITAL | Age: 64
DRG: 202 | End: 2019-11-06
Attending: HOSPITALIST
Payer: MEDICARE

## 2019-11-06 PROCEDURE — 99223 1ST HOSP IP/OBS HIGH 75: CPT | Performed by: INTERNAL MEDICINE

## 2019-11-06 PROCEDURE — 99232 SBSQ HOSP IP/OBS MODERATE 35: CPT | Performed by: HOSPITALIST

## 2019-11-06 PROCEDURE — 93306 TTE W/DOPPLER COMPLETE: CPT | Performed by: HOSPITALIST

## 2019-11-06 RX ORDER — FUROSEMIDE 20 MG/1
20 TABLET ORAL EVERY EVENING
COMMUNITY

## 2019-11-06 RX ORDER — ALBUTEROL SULFATE 90 UG/1
2 AEROSOL, METERED RESPIRATORY (INHALATION) EVERY 4 HOURS PRN
COMMUNITY

## 2019-11-06 RX ORDER — AMIODARONE HYDROCHLORIDE 200 MG/1
200 TABLET ORAL DAILY
Status: DISCONTINUED | OUTPATIENT
Start: 2019-11-06 | End: 2019-11-07

## 2019-11-06 RX ORDER — ATORVASTATIN CALCIUM 80 MG/1
80 TABLET, FILM COATED ORAL DAILY
Status: DISCONTINUED | OUTPATIENT
Start: 2019-11-06 | End: 2019-11-07

## 2019-11-06 RX ORDER — BENZONATATE 200 MG/1
200 CAPSULE ORAL 3 TIMES DAILY PRN
Status: DISCONTINUED | OUTPATIENT
Start: 2019-11-06 | End: 2019-11-07

## 2019-11-06 RX ORDER — METHYLPREDNISOLONE SODIUM SUCCINATE 125 MG/2ML
80 INJECTION, POWDER, LYOPHILIZED, FOR SOLUTION INTRAMUSCULAR; INTRAVENOUS ONCE
Status: COMPLETED | OUTPATIENT
Start: 2019-11-06 | End: 2019-11-06

## 2019-11-06 RX ORDER — METOCLOPRAMIDE HYDROCHLORIDE 5 MG/ML
10 INJECTION INTRAMUSCULAR; INTRAVENOUS EVERY 8 HOURS PRN
Status: DISCONTINUED | OUTPATIENT
Start: 2019-11-06 | End: 2019-11-07

## 2019-11-06 RX ORDER — DEXTROSE MONOHYDRATE 25 G/50ML
50 INJECTION, SOLUTION INTRAVENOUS
Status: DISCONTINUED | OUTPATIENT
Start: 2019-11-06 | End: 2019-11-07

## 2019-11-06 RX ORDER — ACETYLCYSTEINE 200 MG/ML
2 SOLUTION ORAL; RESPIRATORY (INHALATION) 2 TIMES DAILY
Status: DISCONTINUED | OUTPATIENT
Start: 2019-11-06 | End: 2019-11-07

## 2019-11-06 RX ORDER — LOSARTAN POTASSIUM 100 MG/1
100 TABLET ORAL DAILY
Status: DISCONTINUED | OUTPATIENT
Start: 2019-11-06 | End: 2019-11-07

## 2019-11-06 RX ORDER — FUROSEMIDE 10 MG/ML
40 INJECTION INTRAMUSCULAR; INTRAVENOUS
Status: DISCONTINUED | OUTPATIENT
Start: 2019-11-06 | End: 2019-11-06

## 2019-11-06 RX ORDER — AMIODARONE HYDROCHLORIDE 400 MG/1
400 TABLET ORAL DAILY
Status: ON HOLD | COMMUNITY
End: 2019-11-07

## 2019-11-06 RX ORDER — TORSEMIDE 20 MG/1
40 TABLET ORAL DAILY
Status: DISCONTINUED | OUTPATIENT
Start: 2019-11-07 | End: 2019-11-07

## 2019-11-06 RX ORDER — CARVEDILOL 12.5 MG/1
25 TABLET ORAL 2 TIMES DAILY WITH MEALS
Status: DISCONTINUED | OUTPATIENT
Start: 2019-11-06 | End: 2019-11-07

## 2019-11-06 RX ORDER — ISOSORBIDE MONONITRATE 60 MG/1
120 TABLET, EXTENDED RELEASE ORAL NIGHTLY
Status: DISCONTINUED | OUTPATIENT
Start: 2019-11-06 | End: 2019-11-07

## 2019-11-06 RX ORDER — POTASSIUM CHLORIDE 20 MEQ/1
40 TABLET, EXTENDED RELEASE ORAL EVERY 4 HOURS
Status: COMPLETED | OUTPATIENT
Start: 2019-11-06 | End: 2019-11-06

## 2019-11-06 RX ORDER — ACETAMINOPHEN 325 MG/1
650 TABLET ORAL EVERY 6 HOURS PRN
Status: DISCONTINUED | OUTPATIENT
Start: 2019-11-06 | End: 2019-11-07

## 2019-11-06 RX ORDER — TOPIRAMATE 50 MG/1
50 TABLET, FILM COATED ORAL DAILY
COMMUNITY

## 2019-11-06 RX ORDER — SERTRALINE HYDROCHLORIDE 100 MG/1
100 TABLET, FILM COATED ORAL DAILY
Status: DISCONTINUED | OUTPATIENT
Start: 2019-11-06 | End: 2019-11-07

## 2019-11-06 RX ORDER — ONDANSETRON 2 MG/ML
4 INJECTION INTRAMUSCULAR; INTRAVENOUS EVERY 6 HOURS PRN
Status: DISCONTINUED | OUTPATIENT
Start: 2019-11-06 | End: 2019-11-07

## 2019-11-06 RX ORDER — MONTELUKAST SODIUM 10 MG/1
10 TABLET ORAL NIGHTLY
Status: DISCONTINUED | OUTPATIENT
Start: 2019-11-06 | End: 2019-11-07

## 2019-11-06 RX ORDER — WARFARIN SODIUM 2 MG/1
4 TABLET ORAL
Status: COMPLETED | OUTPATIENT
Start: 2019-11-06 | End: 2019-11-06

## 2019-11-06 RX ORDER — PANTOPRAZOLE SODIUM 20 MG/1
20 TABLET, DELAYED RELEASE ORAL
Status: DISCONTINUED | OUTPATIENT
Start: 2019-11-06 | End: 2019-11-07

## 2019-11-06 RX ORDER — FUROSEMIDE 20 MG/1
20 TABLET ORAL DAILY
Status: DISCONTINUED | OUTPATIENT
Start: 2019-11-07 | End: 2019-11-07

## 2019-11-06 RX ORDER — CLOPIDOGREL BISULFATE 75 MG/1
75 TABLET ORAL DAILY
Status: DISCONTINUED | OUTPATIENT
Start: 2019-11-06 | End: 2019-11-07

## 2019-11-06 RX ORDER — ALBUTEROL SULFATE 2.5 MG/3ML
2.5 SOLUTION RESPIRATORY (INHALATION) EVERY 8 HOURS PRN
COMMUNITY

## 2019-11-06 NOTE — PLAN OF CARE
Pt admitted to floor around 2330. Went to ER for SOB x 2-3 days and weight gain. Alert and oriented x4, initially needed 2L O2, but now saturating well on RA while awake. Uses CPAP while asleep. SR on tele. No complaints of pain.   Breath sounds clear/dimin

## 2019-11-06 NOTE — ED PROVIDER NOTES
Patient Seen in: [facility] Emergency Department      History   Patient presents with:  Dyspnea ERMIAS SOB (respiratory)    Stated Complaint: ERMIAS    HPI    26-year-old male with coronary disease heart failure among other medical problems outlined below Never Smoker      Smokeless tobacco: Never Used    Alcohol use: No    Drug use: No             Review of Systems    Positive for stated complaint: ERMIAS  Other systems are as noted in HPI. Constitutional and vital signs reviewed.       All other systems revi BUN/CREA Ratio 7.4 (*)     A/G Ratio 0.8 (*)     All other components within normal limits   PRO BETA NATRIURETIC PEPTIDE - Abnormal; Notable for the following components:    Pro-Beta Natriuretic Peptide 1,153 (*)     All other components within normal acharya of breath. FINDINGS:  Lung volumes are upper normal.  There is improved aeration to     the right lung base with clearing of airspace opacities previously noted     in the infrahilar region. No new consolidation or pleural effusion.       St diagnosis)  Hypoxia    Disposition:  Admit  11/5/2019 10:10 pm    Follow-up:  No follow-up provider specified.       Medications Prescribed:  Current Discharge Medication List                   Present on Admission  Date Reviewed: 11/6/2016          ICD-10-

## 2019-11-06 NOTE — PROGRESS NOTES
[facility] Dosing Service  Warfarin (Coumadin) Subsequent Dosing    Osito Ware is a 75year old male for whom pharmacy has been dosing warfarin (Coumadin) for VTE prophylaxis (pt has h/o PE).  Goal INR is 2-3    Recent Labs   Lab 11/05/19  5647

## 2019-11-06 NOTE — DIETARY NOTE
[facility]     Admitting diagnosis:  Hypoxia [R09.02]  Acute congestive heart failure, unspecified heart failure type ([facility] [address] [I50.9]    Ht: 177.8 cm (5' 10\")  Wt: 125.2 kg (276 lb 0.3 oz).  This is 167% of IBW

## 2019-11-06 NOTE — PROGRESS NOTES
[facility]  Progress Note     Hernan Moon Patient Status:  Inpatient    1955 MRN [id number]   [facility] 2NE-A Attending Malu Gomez MD   Hosp Day # 1 PCP Whitney Marshall MD     Chief Complaint: Dyspnea    S: Patient sta Imaging data reviewed in Epic.     Medications:   • amiodarone HCl  200 mg Oral Daily   • atorvastatin  80 mg Oral Daily   • carvedilol  25 mg Oral BID with meals   • cloNIDine HCl  0.3 mg Oral Daily   • Clopidogrel Bisulfate  75 mg Oral Daily   • Isosorbid

## 2019-11-06 NOTE — H&P
[facility]  History and Physical     Glennette Lights Patient Status:  Inpatient    1955 MRN [id number]   [facility] 2NE-A Attending Jonna Corbett MD   [facility] Day # 1 PCP Moira Reed MD     Chief Complaint: sob    History drink alcohol or use drugs.     Family History:   Family History   Problem Relation Age of Onset   • Heart Disease Father    • Stroke Father    • Cancer Father    • Cancer Mother    • Cancer Maternal Grandfather    • Heart Disease Paternal Grandfather    • daily with meals. , Disp: , Rfl:   Sertraline HCl 100 MG Oral Tab, Take 100 mg by mouth daily. , Disp: , Rfl:   Clopidogrel Bisulfate (PLAVIX) 75 MG Oral Tab, Take 75 mg by mouth daily. , Disp: , Rfl:         Review of Systems:   A comprehensive 14 point revi systolic/diastolic CHF exacerbation  2. hypoxia  1. Consult cards  2. IV diuresis  3. Anemia  4. Hypokalemia  5. Cad  6. htn  7. gerd  8. DM2  9. afib  10. Subtherapeutic INR  1. Resume Coumadin pharmacy to dose  2. Resume home meds  3.  Insulin sliding sca

## 2019-11-06 NOTE — CONSULTS
[facility] Cardiology  Report of Consultation    Rufina Clale Patient Status:  Inpatient    1955 MRN [id number]   [facility] 2NE-A Attending Thania Paz MD   Hosp Day # 1 PCP Monik Simon MD     [facility] for [facility] mg p.o. daily, atorvastatin, carvedilol 25 g p.o. twice a day and losartan 100 mg daily. Potassium supplement. Omega-3 fatty acids. Telemetry: Stable sinus rhythm    EKG: Sinus 76 bpm with no acute ischemia but nonspecific minor changes. Stable EKG. REPLACEMENT SURGERY       Family History   Problem Relation Age of Onset   • Heart Disease Father    • Stroke Father    • Cancer Father    • Cancer Mother    • Cancer Maternal Grandfather    • Heart Disease Paternal Grandfather    • Heart Disease Brother (NOVOLOG) 100 UNIT/ML flexpen 2-10 Units, 2-10 Units, Subcutaneous, TID CC and HS  •  Warfarin Sodium (COUMADIN) tab 4 mg, 4 mg, Oral, Once at night    Review of Systems:     Constitutional: Negative for fever or chills. No significant weight changes.   Obe rhonchi or dullness. Normal excursions and effort. Gastrointestinal: Soft, non-tender abdomen. Extremities: Without clubbing, cyanosis or edema. Peripheral pulses are 2+. Neurologic: Alert and oriented x3. Cranial nerves intact.  Normal sensation and 11/06/2019    .0 11/06/2019    CREATSERUM 0.93 11/06/2019    BUN 8 11/06/2019     11/06/2019    K 3.5 11/06/2019     11/06/2019    CO2 25.0 11/06/2019     11/06/2019    CA 8.2 11/06/2019    ALB 3.4 11/05/2019    ALKPHO 109 11/05/2 outpatient. 9.  Long-term anticoagulation with Coumadin. INR 1.72, subtherapeutic. 10.  Anemia.     Plan:  -Discontinue IV Lasix -patient has compensated chronic heart failure with reduced ejection fraction with no decompensation now - IV Lasix dose give

## 2019-11-06 NOTE — HISTORICAL OFFICE NOTE
[facility]  Clinic Note    Ese Mcdermott  : 1955  PCP: Brandie Arredondo MD    Reason for Consultation:  Chief Complaint   Patient presents with   • Follow-up     Assessment/Plan:  Ese Mcdermott is a 70year old man with isc 2018,   • Coronary artery bypass graft   4V CABG 11/00, 8/2017   • Left heart cath   September 2018,   • Mri   MRI compatible as of 3/1/18   • Other surgical history   patent seq,saph. vein graft to the 1st OM,main circ   • Other surgical history   dist. R for this visit. Review of Systems:  All systems reviewed and negative.      Physical Exam:  Visit Vitals  /68   Pulse 62   Ht 5' 9\" (1.753 m)   Wt (!) 138.8 kg (306 lb)   BMI 45.19 kg/m²     Gen: no acute distress  Neuro: alert and oriented x 3

## 2019-11-06 NOTE — PROGRESS NOTES
[facility] Dosing Service  Warfarin (Coumadin) Initial Dosing    Afsaneh Anglin is a 75year old male for whom pharmacy has been consulted to dose warfarin (COUMADIN) for Prophylaxis of venous thrombosis by Dr. Jaquelin Barnes.   Based on this indication, go

## 2019-11-06 NOTE — ED INITIAL ASSESSMENT (HPI)
Here for increasing SOB with frequent cough, claimed that he been steadily gaining weight daily. \"dont feel good, just taking the trash outside\".

## 2019-11-06 NOTE — RESPIRATORY THERAPY NOTE
FAISAL - Equipment Use Daily Summary:                  . Set Mode: CPAP                . Usage in hours: 6:53                . 90% Pressure (EPAP) level: 9.0                . 90% Insp. Pressure (IPAP): Guadalupe Riedel AHI: 1.7                .  Supplemental Ox

## 2019-11-06 NOTE — PLAN OF CARE
Patient NSR (SB while asleep) with HR 55-60 at rest.  Denies CP. No edema present. VSS, afebrile.  k replaced per protocol. Saturating 97% on RA awake, CPAP used for sleeping. Denies SOB. Chronic non-productive cough, tessalon given. Denies pain.

## 2019-11-07 VITALS
DIASTOLIC BLOOD PRESSURE: 66 MMHG | SYSTOLIC BLOOD PRESSURE: 124 MMHG | RESPIRATION RATE: 19 BRPM | OXYGEN SATURATION: 95 % | HEART RATE: 58 BPM | HEIGHT: 70 IN | TEMPERATURE: 99 F | WEIGHT: 273.13 LBS | BODY MASS INDEX: 39.1 KG/M2

## 2019-11-07 PROCEDURE — 99232 SBSQ HOSP IP/OBS MODERATE 35: CPT | Performed by: INTERNAL MEDICINE

## 2019-11-07 PROCEDURE — 99239 HOSP IP/OBS DSCHRG MGMT >30: CPT | Performed by: INTERNAL MEDICINE

## 2019-11-07 RX ORDER — AMIODARONE HYDROCHLORIDE 200 MG/1
200 TABLET ORAL DAILY
Qty: 30 TABLET | Refills: 3 | Status: SHIPPED | OUTPATIENT
Start: 2019-11-07

## 2019-11-07 RX ORDER — PREDNISONE 20 MG/1
TABLET ORAL
Qty: 7 TABLET | Refills: 0 | Status: SHIPPED | OUTPATIENT
Start: 2019-11-07

## 2019-11-07 RX ORDER — WARFARIN SODIUM 2 MG/1
4 TABLET ORAL ONCE
Status: DISCONTINUED | OUTPATIENT
Start: 2019-11-07 | End: 2019-11-07

## 2019-11-07 RX ORDER — ALBUTEROL SULFATE 90 UG/1
1 AEROSOL, METERED RESPIRATORY (INHALATION) EVERY 6 HOURS PRN
Qty: 1 INHALER | Refills: 0 | Status: SHIPPED | OUTPATIENT
Start: 2019-11-07

## 2019-11-07 NOTE — DISCHARGE SUMMARY
[facility] [place] [facility]PITAL[facility]  DISCHARGE SUMMARY     Rosalind Mckeon Patient Status:  Inpatient    1955 MRN [id number]   [facility] 2NE-A Attending No att. providers found   Hosp Day # 2 PCP Noe Oquendo MD     Date of Admission: 2019  Da and when to take each. Take 2.5 mg by nebulization every 8 (eight) hours as needed for Wheezing.    Refills:  0     VENTOLIN  (90 Base) MCG/ACT Aers  Generic drug:  Albuterol Sulfate HFA  What changed:  Another medication with the same name was schedule      Take 75 mg by mouth daily. Refills:  0     furosemide 20 MG Tabs  Commonly known as:  LASIX  Notes to patient:  Continue previous medication schedule      Take 20 mg by mouth every evening.    Refills:  0     Isosorbide Mononitrate  MG Take 40 mg by mouth daily. Refills:  0     Warfarin Sodium 3 MG Tabs  Commonly known as:  COUMADIN  Notes to patient:  Continue previous medication schedule      Take 3 mg by mouth See Admin Instructions.  Take 3 mg on Monday, Tuesday, Thursday, Friday, S rhythm. No murmurs, rubs or gallops. Abdomen: Soft, nontender, nondistended. Positive bowel sounds. No rebound or guarding. Neurologic: No focal neurological deficits. Musculoskeletal: Moves all extremities. Extremities: No edema.   -----------------

## 2019-11-07 NOTE — PLAN OF CARE
Assumed care of pt. At 0700. Pt. Resting in bed. Aox4. Sats mid 90s on RA. CPAP at night. Occasional cough with small amounts of white, thick sputum. NSR/SB on tele, will continue to monitor. Elevated BP, will continue to monitor before d/c.  Continent of and temperature  - Assess for signs of decreased coronary artery perfusion - ex.  Angina  - Evaluate fluid balance, assess for edema, trend weights  Outcome: Adequate for Discharge  Goal: Absence of cardiac arrhythmias or at baseline  Description  INTERVENT

## 2019-11-07 NOTE — PROGRESS NOTES
Pharmacy to sign of warfarin dosing per Dr. Emy Witt. Please consult pharmacy with any further questions.

## 2019-11-07 NOTE — PROGRESS NOTES
[facility] Dosing Service  Warfarin (Coumadin) Subsequent Dosing    Hernan Moon is a 75year old male for whom pharmacy has been dosing warfarin (Coumadin) for VTE prophylaxis (patient has h/o PE) for Dr. Melo Bright.  Goal INR is 2-3    Recent Labs

## 2019-11-07 NOTE — PLAN OF CARE
Alert. Oriented. Sbrady per tele. Hr 40-50s. Denies cp, sob. Pt still w/ congested cough, at times prod. cough w/ small thick white phlegm. Resp panel(-). Paddy/cpap- conpliant. No edema noted. Poc discussed with patient. Cont. to monitor pt.

## 2019-11-07 NOTE — RESPIRATORY THERAPY NOTE
FAISAL - Equipment Use Daily Summary:                  . Set Mode: CPAP                . Usage in hours: 14:06                . 90% Pressure (EPAP) level: 9.0                . 90% Insp. Pressure (IPAP): Sarah Pineda AHI: 3.7                .  Supplemental O

## 2019-11-07 NOTE — PROGRESS NOTES
[facility]  Cardiology Progress Note    Subjective:  No chest pain or shortness of breath.     Objective:  BP (!) 166/91   Pulse 53   Temp 97.5 °F (36.4 °C) (Oral)   Resp 19   Ht 5' 10\" (1.778 m)   Wt 273 lb 2.4 oz (123.9 kg)   SpO2 98%   BMI 39.19 kg valve: Trileaflet; mildly calcified leaflets. Transvalvular     velocity was within the normal range. There was no stenosis. Trivial     regurgitation. 3. Mitral valve: Mildly calcified annulus. There was mild regurgitation.   4. Left atrium: The left atri cough over few days. IV Lasix given empirically in ER. Most likely residual bronchopneumonia from 2 weeks ago but it can be viral flareup now based on chest x-ray and clinical presentation and story.  No fluid overload by physical exam. No real congesti all.  We will leave short tapering course of prednisone up to 7 days to hospitalist.  Bronchodilators per hospitalist.  Coumadin followed by primary care physician. Check INR in 3 days.   INR mildly subtherapeutic but patient is in sinus and INR may go up

## 2019-11-08 NOTE — DISCHARGE PLANNING
NURSING DISCHARGE NOTE  Education presented to pt. In the room. He demonstrated and verbalized understanding. PIV removed with catheter intact. Discharged Home via Ambulatory. Accompanied by Family member  Belongings Taken by patient/family.

## 2019-11-15 ENCOUNTER — OFFICE VISIT (OUTPATIENT)
Dept: CARDIOLOGY | Age: 64
End: 2019-11-15

## 2019-11-15 VITALS
DIASTOLIC BLOOD PRESSURE: 68 MMHG | HEIGHT: 70 IN | WEIGHT: 278 LBS | HEART RATE: 56 BPM | BODY MASS INDEX: 39.8 KG/M2 | SYSTOLIC BLOOD PRESSURE: 128 MMHG

## 2019-11-15 DIAGNOSIS — I25.5 ISCHEMIC CARDIOMYOPATHY: ICD-10-CM

## 2019-11-15 DIAGNOSIS — I50.9 CONGESTIVE HEART FAILURE, UNSPECIFIED HF CHRONICITY, UNSPECIFIED HEART FAILURE TYPE (CMD): Primary | ICD-10-CM

## 2019-11-15 DIAGNOSIS — Z95.810 ICD (IMPLANTABLE CARDIOVERTER-DEFIBRILLATOR) IN PLACE: ICD-10-CM

## 2019-11-15 DIAGNOSIS — I47.20 VENTRICULAR TACHYCARDIA (CMD): ICD-10-CM

## 2019-11-15 PROBLEM — I50.22 CHRONIC SYSTOLIC CONGESTIVE HEART FAILURE (CMD): Status: ACTIVE | Noted: 2019-11-15

## 2019-11-15 PROCEDURE — 99213 OFFICE O/P EST LOW 20 MIN: CPT | Performed by: NURSE PRACTITIONER

## 2019-11-15 ASSESSMENT — ENCOUNTER SYMPTOMS
HEMATOCHEZIA: 0
SUSPICIOUS LESIONS: 0
FEVER: 0
CHILLS: 0
WEIGHT GAIN: 0
ALLERGIC/IMMUNOLOGIC COMMENTS: NO NEW FOOD ALLERGIES
BRUISES/BLEEDS EASILY: 0
WEIGHT LOSS: 0
HEMOPTYSIS: 0
COUGH: 0

## 2019-12-03 ENCOUNTER — TELEPHONE (OUTPATIENT)
Dept: CARDIOLOGY | Age: 64
End: 2019-12-03

## 2019-12-04 ENCOUNTER — TELEPHONE (OUTPATIENT)
Dept: CARDIOLOGY | Age: 64
End: 2019-12-04

## 2019-12-04 DIAGNOSIS — I25.5 ISCHEMIC CARDIOMYOPATHY: ICD-10-CM

## 2019-12-04 DIAGNOSIS — I50.9 CONGESTIVE HEART FAILURE, UNSPECIFIED HF CHRONICITY, UNSPECIFIED HEART FAILURE TYPE (CMD): ICD-10-CM

## 2019-12-04 DIAGNOSIS — I50.22 CHRONIC SYSTOLIC CONGESTIVE HEART FAILURE (CMD): ICD-10-CM

## 2019-12-04 DIAGNOSIS — I50.22 CHRONIC SYSTOLIC CONGESTIVE HEART FAILURE (CMD): Primary | ICD-10-CM

## 2019-12-04 RX ORDER — TORSEMIDE 20 MG/1
TABLET ORAL
Qty: 90 TABLET | Refills: 0 | Status: SHIPPED | OUTPATIENT
Start: 2019-12-04 | End: 2019-12-20 | Stop reason: SDUPTHER

## 2019-12-05 RX ORDER — TORSEMIDE 20 MG/1
TABLET ORAL
Qty: 270 TABLET | Refills: 0 | OUTPATIENT
Start: 2019-12-05

## 2019-12-09 ENCOUNTER — APPOINTMENT (OUTPATIENT)
Dept: CARDIOLOGY | Age: 64
End: 2019-12-09

## 2019-12-12 ENCOUNTER — TELEPHONE (OUTPATIENT)
Dept: CARDIOLOGY | Age: 64
End: 2019-12-12

## 2019-12-13 ENCOUNTER — LAB ENCOUNTER (OUTPATIENT)
Dept: LAB | Age: 64
End: 2019-12-13
Attending: NURSE PRACTITIONER
Payer: MEDICARE

## 2019-12-13 DIAGNOSIS — I25.5 ISCHEMIC CARDIOMYOPATHY: Primary | ICD-10-CM

## 2019-12-13 DIAGNOSIS — I47.2 PAROXYSMAL VENTRICULAR TACHYCARDIA (HCC): ICD-10-CM

## 2019-12-13 DIAGNOSIS — I50.9 HEART FAILURE, UNSPECIFIED (HCC): ICD-10-CM

## 2019-12-13 LAB
ANION GAP SERPL CALC-SCNC: NORMAL MMOL/L
BUN SERPL-MCNC: 15 MG/DL
BUN/CREAT SERPL: NORMAL
CALCIUM SERPL-MCNC: 8.4 MG/DL
CHLORIDE SERPL-SCNC: 103 MMOL/L
CO2 SERPL-SCNC: NORMAL MMOL/L
CREAT SERPL-MCNC: 1.3 MG/DL
GLUCOSE SERPL-MCNC: 82 MG/DL
LENGTH OF FAST TIME PATIENT: NORMAL H
POTASSIUM SERPL-SCNC: 3.8 MMOL/L
SODIUM SERPL-SCNC: 139 MMOL/L

## 2019-12-13 PROCEDURE — 80048 BASIC METABOLIC PNL TOTAL CA: CPT

## 2019-12-13 PROCEDURE — 83880 ASSAY OF NATRIURETIC PEPTIDE: CPT

## 2019-12-13 PROCEDURE — 36415 COLL VENOUS BLD VENIPUNCTURE: CPT

## 2019-12-15 ENCOUNTER — ANCILLARY PROCEDURE (OUTPATIENT)
Dept: CARDIOLOGY | Age: 64
End: 2019-12-15
Attending: INTERNAL MEDICINE

## 2019-12-15 DIAGNOSIS — Z95.810 ICD (IMPLANTABLE CARDIOVERTER-DEFIBRILLATOR) IN PLACE: ICD-10-CM

## 2019-12-20 ENCOUNTER — OFFICE VISIT (OUTPATIENT)
Dept: CARDIOLOGY | Age: 64
End: 2019-12-20

## 2019-12-20 VITALS
HEART RATE: 66 BPM | SYSTOLIC BLOOD PRESSURE: 92 MMHG | HEIGHT: 68 IN | WEIGHT: 279 LBS | DIASTOLIC BLOOD PRESSURE: 62 MMHG | BODY MASS INDEX: 42.28 KG/M2

## 2019-12-20 DIAGNOSIS — I50.22 CHRONIC SYSTOLIC CONGESTIVE HEART FAILURE (CMD): ICD-10-CM

## 2019-12-20 DIAGNOSIS — I50.9 CONGESTIVE HEART FAILURE, UNSPECIFIED HF CHRONICITY, UNSPECIFIED HEART FAILURE TYPE (CMD): ICD-10-CM

## 2019-12-20 DIAGNOSIS — R06.09 DYSPNEA ON EXERTION: Primary | ICD-10-CM

## 2019-12-20 DIAGNOSIS — I25.5 ISCHEMIC CARDIOMYOPATHY: ICD-10-CM

## 2019-12-20 PROCEDURE — 99214 OFFICE O/P EST MOD 30 MIN: CPT | Performed by: NURSE PRACTITIONER

## 2019-12-20 RX ORDER — ISOSORBIDE MONONITRATE 120 MG/1
120 TABLET, EXTENDED RELEASE ORAL DAILY
COMMUNITY
Start: 2019-09-30

## 2019-12-20 RX ORDER — AMIODARONE HYDROCHLORIDE 200 MG/1
200 TABLET ORAL DAILY
COMMUNITY

## 2019-12-20 RX ORDER — TORSEMIDE 20 MG/1
TABLET ORAL
Qty: 120 TABLET | Refills: 3 | Status: SHIPPED | OUTPATIENT
Start: 2019-12-20

## 2019-12-20 ASSESSMENT — PATIENT HEALTH QUESTIONNAIRE - PHQ9
2. FEELING DOWN, DEPRESSED OR HOPELESS: NOT AT ALL
1. LITTLE INTEREST OR PLEASURE IN DOING THINGS: NOT AT ALL
SUM OF ALL RESPONSES TO PHQ9 QUESTIONS 1 AND 2: 0
SUM OF ALL RESPONSES TO PHQ9 QUESTIONS 1 AND 2: 0

## 2019-12-20 ASSESSMENT — ENCOUNTER SYMPTOMS
SYNCOPE: 0
WEIGHT GAIN: 0
SHORTNESS OF BREATH: 0
DIAPHORESIS: 0
WEIGHT LOSS: 0
GASTROINTESTINAL NEGATIVE: 1
NEUROLOGICAL NEGATIVE: 1
DECREASED APPETITE: 0

## 2019-12-23 ENCOUNTER — CLINICAL ABSTRACT (OUTPATIENT)
Dept: CARDIOLOGY | Age: 64
End: 2019-12-23

## 2019-12-23 DIAGNOSIS — I25.5 ISCHEMIC CARDIOMYOPATHY: ICD-10-CM

## 2019-12-23 DIAGNOSIS — I47.20 VENTRICULAR TACHYCARDIA (CMD): ICD-10-CM

## 2019-12-23 DIAGNOSIS — I50.9 CONGESTIVE HEART FAILURE, UNSPECIFIED HF CHRONICITY, UNSPECIFIED HEART FAILURE TYPE (CMD): ICD-10-CM

## 2019-12-26 ENCOUNTER — TELEPHONE (OUTPATIENT)
Dept: CARDIOLOGY | Age: 64
End: 2019-12-26

## 2019-12-26 DIAGNOSIS — I50.22 CHRONIC SYSTOLIC CONGESTIVE HEART FAILURE (CMD): Primary | ICD-10-CM

## 2019-12-26 DIAGNOSIS — R06.09 DYSPNEA ON EXERTION: ICD-10-CM

## 2020-01-10 RX ORDER — CLOPIDOGREL BISULFATE 75 MG/1
TABLET ORAL
Qty: 90 TABLET | Refills: 3 | Status: SHIPPED | OUTPATIENT
Start: 2020-01-10 | End: 2021-01-18

## 2020-02-21 RX ORDER — ISOSORBIDE MONONITRATE 60 MG/1
TABLET, EXTENDED RELEASE ORAL
Qty: 135 TABLET | Refills: 0 | OUTPATIENT
Start: 2020-02-21

## 2020-04-15 ENCOUNTER — APPOINTMENT (OUTPATIENT)
Dept: CARDIOLOGY | Age: 65
End: 2020-04-15
Attending: INTERNAL MEDICINE

## 2020-04-27 ENCOUNTER — ANCILLARY PROCEDURE (OUTPATIENT)
Dept: CARDIOLOGY | Age: 65
End: 2020-04-27
Attending: INTERNAL MEDICINE

## 2020-04-27 ENCOUNTER — ANCILLARY ORDERS (OUTPATIENT)
Dept: CARDIOLOGY | Age: 65
End: 2020-04-27

## 2020-04-27 DIAGNOSIS — Z45.02 ENCOUNTER FOR ASSESSMENT OF IMPLANTABLE CARDIOVERTER-DEFIBRILLATOR (ICD): ICD-10-CM

## 2020-04-27 PROCEDURE — X1114 CARDIAC DEVICE HOME CHECK - REMOTE UNSCHEDULED: HCPCS | Performed by: INTERNAL MEDICINE

## 2020-04-27 PROCEDURE — 93295 DEV INTERROG REMOTE 1/2/MLT: CPT | Performed by: INTERNAL MEDICINE

## 2020-05-13 ENCOUNTER — TELEPHONE (OUTPATIENT)
Dept: CARDIOLOGY | Age: 65
End: 2020-05-13

## 2020-05-27 NOTE — DISCHARGE PLANNING
NURSING DISCHARGE NOTE    Discharged Home via Wheelchair. Accompanied by Support staff  Belongings Taken by patient/family. NSR on tele. Pt discharged home with son, clearance given from [facility]. Pt did not complain of pain @R. groin.  Skin No

## 2020-07-09 ENCOUNTER — TELEPHONE (OUTPATIENT)
Dept: CARDIOLOGY | Age: 65
End: 2020-07-09

## 2020-08-26 ENCOUNTER — TELEPHONE (OUTPATIENT)
Dept: CARDIOLOGY | Age: 65
End: 2020-08-26

## 2021-01-03 NOTE — ED INITIAL ASSESSMENT (HPI)
Dx with bronchitis 2 weeks ago  tx with inhaler and antibiotics,  Slightly improved, but now worse again
Unknown

## 2021-01-16 ENCOUNTER — TELEPHONE (OUTPATIENT)
Dept: CARDIOLOGY | Age: 66
End: 2021-01-16

## 2021-01-18 RX ORDER — CLOPIDOGREL BISULFATE 75 MG/1
75 TABLET ORAL DAILY
Qty: 30 TABLET | Refills: 0 | Status: SHIPPED | OUTPATIENT
Start: 2021-01-18 | End: 2021-02-19

## 2021-01-18 RX ORDER — CLOPIDOGREL BISULFATE 75 MG/1
75 TABLET ORAL DAILY
Qty: 90 TABLET | OUTPATIENT
Start: 2021-01-18

## 2021-02-18 DIAGNOSIS — I25.5 ISCHEMIC CARDIOMYOPATHY: Primary | ICD-10-CM

## 2021-02-19 RX ORDER — CLOPIDOGREL BISULFATE 75 MG/1
75 TABLET ORAL DAILY
Qty: 30 TABLET | Refills: 0 | Status: SHIPPED | OUTPATIENT
Start: 2021-02-19 | End: 2021-03-30

## 2021-03-27 ENCOUNTER — TELEPHONE (OUTPATIENT)
Dept: CARDIOLOGY | Age: 66
End: 2021-03-27

## 2021-03-30 RX ORDER — CLOPIDOGREL BISULFATE 75 MG/1
75 TABLET ORAL DAILY
Qty: 15 TABLET | Refills: 0 | Status: SHIPPED | OUTPATIENT
Start: 2021-03-30

## 2021-04-12 ENCOUNTER — TELEPHONE (OUTPATIENT)
Dept: CARDIOLOGY | Age: 66
End: 2021-04-12

## 2021-04-13 RX ORDER — CLOPIDOGREL BISULFATE 75 MG/1
TABLET ORAL
Qty: 15 TABLET | Refills: 0 | OUTPATIENT
Start: 2021-04-13
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
## 2024-11-20 ENCOUNTER — TELEPHONE (OUTPATIENT)
Dept: INTERNAL MEDICINE | Age: 69
End: 2024-11-20

## (undated) NOTE — ED AVS SNAPSHOT
Mr. Malou Roman   MRN: [id number]    Department:  [facility] Emergency Department   Date of Visit:  8/20/2019           Disclosure     Insurance plans vary and the physician(s) referred by the ER may not be covered by your plan.  Please cont tell this physician (or your personal doctor if your instructions are to return to your personal doctor) about any new or lasting problems. The primary care or specialist physician will see patients referred from the [facility] Emergency Department.  Akua Lu

## (undated) NOTE — LETTER
[facility] [address], [address] [place] [address]  Consent for Procedure/Sedation    Date:     Time:       1.  I authorize the performance upon Leonor Galdamez the followin [facility] ________________________________    ___________________    Witness: _________________________      Date: ___________________    Printed: 10/17/2018   1:16 PM  Patient Name: Tabby Del Angel        : 1955       Medical Record #: [id number]

## (undated) NOTE — IP AVS SNAPSHOT
[facility] [place] [address] [place], [address] ~ [phone]                Discharge Summary   3/16/2017    Mr. Little Romulo           Admission Information        Provider Department    3/16/2017 Martha Peace MD  2ne-A carvedilol 25 MG Tabs   Last time this was given:  25 mg on 3/16/2017  6:43 PM   Commonly known as:  COREG        Take 25 mg by mouth 2 (two) times daily with meals.                                CloNIDine HCl 0.2 MG Tabs   Last time this was given:  0.3 Last time this was given:  100 mg on 3/16/2017  9:19 AM   Commonly known as:  ZOLOFT        Take 100 mg by mouth daily.                             torsemide 20 MG Tabs   Last time this was given:  20 mg on 3/16/2017  9:19 AM   Commonly known as:  Heriberto Bread 1.69 (03/16/17)  0.68 (H) (03/16/17)  0.05 (03/16/17)  0.03    (03/02/17)  64.5 (03/02/17)  24.0 (03/02/17)  9.0 (03/02/17)  1.5 (03/02/17)  0.7  (03/02/17)  3.86 (03/02/17)  1.44 (03/02/17)  0.54 (03/02/17)  0.09 (03/02/17)  0.18      Metabolic Lab Result [employer] Highlights will allow you to access patient instructions from your recent visit,  view other health information, and more. To sign up or find more information, go to [email or web address] and click on the Sign Up Now link in the [employer] box.      Enter weakness, numbness           Water Pills     torsemide 20 MG Oral Tab       Use: Treat high blood pressure, swelling in legs, too much fluid    Most common side effects: Dizziness, loss of potassium (increased urination is expected)   What to report to you acetaminophen 500 MG Oral Chew Tab       Use: Treat pain, fever, inflammation   Most common side effects: Stomach upset   What to report to your healthcare team: Stomach upset, unresolved pain           GI Medications     Omeprazole Magnesium 20.6 (20 Bas

## (undated) NOTE — LETTER
[facility] [address], [address]  Consent for Procedure/Sedation    Date:       Time:       1.  I authorize the performance upon Deborah Rodriguez the following:cardiac catheterization, left ventricular cineangiography, bilateral period, the physician will determine when the applicable recovery period ends for purposes of reinstating the Do Not Resuscitate (DNR) order.     Signature of Patient: ____________________________________________________    Signature of person authorized

## (undated) NOTE — LETTER
[facility]  Phillip [address] [facility], [address] [place] [address]    Consent for Anesthesia   1.   IBirgit agree to be cared for by an anesthesiologist, who is specially trained to monitor me and give me medicine to put me to sleep or keep me comfo vision, nerves, or muscles and in extremely rare instances death. 5. My doctor has explained to me other choices available to me for my care (alternatives).   6. Pregnant Patients (“epidural”):  I understand that the risks of having an epidural (medicine g

## (undated) NOTE — LETTER
[facility] [address], [address]  Consent for Procedure/Sedation    Date: 8/17/2017    Time: 1400      1.  I authorize the performance upon Dixon Mcnulty the following:cardiac catheterization, left ventricular cineangiography period, the physician will determine when the applicable recovery period ends for purposes of reinstating the Do Not Resuscitate (DNR) order.     Signature of Patient: ____________________________________________________    Signature of person authorized

## (undated) NOTE — ED AVS SNAPSHOT
[facility] Emergency Department    [place] [address]    Phone:  [phone]    Fax:  [phone]             Sesarin Baldemar   MRN: [id number]    Department:  [facility] Emergency Department   Date of Visit You can get these medications from any pharmacy     Bring a paper prescription for each of these medications    - Isosorbide Mononitrate ER 30 MG Tb24              Discharge Instructions       Follow-up with Dr. Shanti Henderson of [facility].   Call to make a fol to a primary care or a specialist physician for a follow-up visit, please tell this physician (or your personal doctor if your instructions are to return to your personal doctor) about any new or lasting problems.  The primary care or specialist physician w We are concerned for your overall well being:    - If you are a smoker or have smoked in the last 12 months, we encourage you to explore options for quitting.     - If you have concerns related to behavioral health issues or thoughts of harming yourself, information, go to [email or web address] and click on the Sign Up Now link in the [employer] box. Enter your [email or web address] Activation Code exactly as it appears below along with your Zip Code and Date of Birth to complete the sign-up process.  If you do

## (undated) NOTE — ED AVS SNAPSHOT
Mr. Ese Mcdermott   MRN: [id number]    Department:  [facility] Emergency Department   Date of Visit:  10/8/2017           Disclosure     Insurance plans vary and the physician(s) referred by the ER may not be covered by your plan.  Please cont If you have been prescribed any medication(s), please fill your prescription right away and begin taking the medication(s) as directed    If the emergency physician has read X-rays, these will be re-interpreted by a radiologist.  If there is a significant

## (undated) NOTE — ED AVS SNAPSHOT
[facility] Emergency Department    [facility] [place] [address]    Phone:  [phone]    Fax:  [phone]           Mr. Mya Gresham   MRN: [id number]    Department:  [facility] Emergency Department   Date of Visit IF THERE IS ANY CHANGE OR WORSENING OF YOUR CONDITION, CALL YOUR PRIMARY CARE PHYSICIAN AT ONCE OR RETURN IMMEDIATELY TO THE EMERGENCY DEPARTMENT.     If you have been prescribed any medication(s), please fill your prescription right away and begin taking t

## (undated) NOTE — ED AVS SNAPSHOT
Mr. Dixon Mcnulty   MRN: [id number]    Department:  [facility] Emergency Department   Date of Visit:  9/7/2019           Disclosure     Insurance plans vary and the physician(s) referred by the ER may not be covered by your plan.  Please conta tell this physician (or your personal doctor if your instructions are to return to your personal doctor) about any new or lasting problems. The primary care or specialist physician will see patients referred from the [facility] Emergency Department.  Young Wylie